# Patient Record
Sex: MALE | Race: WHITE
[De-identification: names, ages, dates, MRNs, and addresses within clinical notes are randomized per-mention and may not be internally consistent; named-entity substitution may affect disease eponyms.]

---

## 2018-02-14 ENCOUNTER — HOSPITAL ENCOUNTER (INPATIENT)
Dept: HOSPITAL 74 - YASAS | Age: 58
LOS: 6 days | Discharge: HOME | DRG: 897 | End: 2018-02-20
Attending: INTERNAL MEDICINE | Admitting: INTERNAL MEDICINE
Payer: COMMERCIAL

## 2018-02-14 VITALS — BODY MASS INDEX: 21.7 KG/M2

## 2018-02-14 DIAGNOSIS — Z91.5: ICD-10-CM

## 2018-02-14 DIAGNOSIS — F19.24: ICD-10-CM

## 2018-02-14 DIAGNOSIS — J45.909: ICD-10-CM

## 2018-02-14 DIAGNOSIS — F10.230: Primary | ICD-10-CM

## 2018-02-14 DIAGNOSIS — Z86.79: ICD-10-CM

## 2018-02-14 DIAGNOSIS — Z96.643: ICD-10-CM

## 2018-02-14 DIAGNOSIS — Z86.19: ICD-10-CM

## 2018-02-14 DIAGNOSIS — G47.00: ICD-10-CM

## 2018-02-14 DIAGNOSIS — Z95.1: ICD-10-CM

## 2018-02-14 LAB
APPEARANCE UR: CLEAR
BILIRUB UR STRIP.AUTO-MCNC: NEGATIVE MG/DL
COLOR UR: (no result)
KETONES UR QL STRIP: NEGATIVE
LEUKOCYTE ESTERASE UR QL STRIP.AUTO: NEGATIVE
NITRITE UR QL STRIP: NEGATIVE
PH UR: 5 [PH] (ref 5–8)
PROT UR QL STRIP: NEGATIVE
PROT UR QL STRIP: NEGATIVE
RBC # UR STRIP: NEGATIVE /UL
SP GR UR: 1.01 (ref 1–1.03)
UROBILINOGEN UR STRIP-MCNC: (no result) MG/DL (ref 0.2–1)

## 2018-02-14 PROCEDURE — HZ2ZZZZ DETOXIFICATION SERVICES FOR SUBSTANCE ABUSE TREATMENT: ICD-10-PCS | Performed by: INTERNAL MEDICINE

## 2018-02-14 RX ADMIN — HYDROXYZINE PAMOATE PRN MG: 50 CAPSULE ORAL at 22:05

## 2018-02-14 RX ADMIN — Medication SCH MG: at 22:03

## 2018-02-14 RX ADMIN — TOLNAFTATE SCH APPLIC: 10 CREAM TOPICAL at 22:03

## 2018-02-14 NOTE — CONSULT
[facility] Psychiatric Consult





- Data


Date of interview: 02/14/18


Admission source: [facility]


Identifying data: Readmission to [facility] for this 56 y/o  male 

seeking detox treatment on [address] for alcohol dependence.Patient is  

without children,domiciled,unemployed,disabled and supported on SSD benefits.


Substance Abuse History: Confirmed by patient in this interview.Smoking history

: Former smoker.  Have you smoked in the past 12 months: No.  If you are a 

former smoker, when did you quit?: 1996.  Hx Chewing Tobacco Use: No.  

Initiated information on smoking cessation: No.  - Substance & Tx. History.  Hx 

Alcohol Use: Yes.  Hx Substance Use: No.  Substance Use Type: Alcohol.  Hx 

Substance Use Treatment: Yes (last detox 2014 [facility]).  - Substances Abused.  

** Alcohol-vodka/beer.  Route: Oral.  Frequency: Daily.  Amount used: 1 pt./6-8 

(16 oz.).  Age of first use: 16.  Date of Last Use: 02/13/18


Medical History: Heart murmur,antecedent of treatment for gonorrhea,abdominal 

herniorraphy and recent history of orthosurgery (left hip prosthesis).


Psychiatric History: Patient reports a remote history of two psychiatric 

hospitalizations at [facility].Diagnosed with MDD.Mr Padron 

remembers past treatment with seroquel (other medications not recalled).Lost to 

OPD care for years as per self-report (no medications,no contact with mental 

healthcare providers).Patient admits to a suicide attempt," in the 1990's ",via 

self-mutilation (stabbed self).


Physical/Sexual Abuse/Trauma History: Patient denies history of abuse.


Additional Comment: Drug Screen is negative.





Mental Status Exam





- Mental Status Exam


Alert and Oriented to: Time, Place, Person


Cognitive Function: Good


Patient Appearance: Well Groomed


Mood: Nervous, Withdrawn, Anxious


Affect: Mood Congruent, Constricted


Patient Behavior: Passive, Fatigued, Cooperative


Speech Pattern: Clear, Appropriate


Voice Loudness: Normal


Thought Process: Intact, Goal Oriented


Thought Disorder: Not Present


Hallucinations: Denies


Suicidal Ideation: Denies


Homicidal Ideation: Denies


Insight/Judgement: Poor


Sleep: Poorly, Difficulty falling asleep


Appetite: Poor


Gait/Station: Other (gait not observed : patient remains supine during entire 

interview)





Psychiatric Findings





- Problem List (Axis 1, 2,3)


(1) Alcohol dependence with uncomplicated withdrawal


Current Visit: Yes   Status: Acute   





(2) Substance induced mood disorder


Current Visit: Yes   Status: Acute   





(3) Insomnia


Current Visit: Yes   Status: Acute   





- Initial Treatment Plan


Initial Treatment Plan: Psychoeducation and support.Sleep 

hygiene.Detoxification in progress.Ambien 5 mg po hs prn.Patient informed of 

risk of parasomnias (sleep-walking).Mr Padron agrees with this 

careplan.Observation.

## 2018-02-15 LAB
ALBUMIN SERPL-MCNC: 3.9 G/DL (ref 3.4–5)
ALP SERPL-CCNC: 140 U/L (ref 45–117)
ALT SERPL-CCNC: 41 U/L (ref 12–78)
ANION GAP SERPL CALC-SCNC: 8 MMOL/L (ref 8–16)
AST SERPL-CCNC: 64 U/L (ref 15–37)
BILIRUB SERPL-MCNC: 1.1 MG/DL (ref 0.2–1)
BUN SERPL-MCNC: 6 MG/DL (ref 7–18)
CALCIUM SERPL-MCNC: 8.7 MG/DL (ref 8.5–10.1)
CHLORIDE SERPL-SCNC: 101 MMOL/L (ref 98–107)
CO2 SERPL-SCNC: 28 MMOL/L (ref 21–32)
CREAT SERPL-MCNC: 0.7 MG/DL (ref 0.7–1.3)
DEPRECATED RDW RBC AUTO: 14.2 % (ref 11.9–15.9)
GLUCOSE SERPL-MCNC: 84 MG/DL (ref 74–106)
HCT VFR BLD CALC: 43.8 % (ref 35.4–49)
HGB BLD-MCNC: 14.3 GM/DL (ref 11.7–16.9)
MCH RBC QN AUTO: 31.3 PG (ref 25.7–33.7)
MCHC RBC AUTO-ENTMCNC: 32.5 G/DL (ref 32–35.9)
MCV RBC: 96.3 FL (ref 80–96)
PLATELET # BLD AUTO: 166 K/MM3 (ref 134–434)
PMV BLD: 7.6 FL (ref 7.5–11.1)
POTASSIUM SERPLBLD-SCNC: 4.3 MMOL/L (ref 3.5–5.1)
PROT SERPL-MCNC: 8.6 G/DL (ref 6.4–8.2)
RBC # BLD AUTO: 4.56 M/MM3 (ref 4–5.6)
SODIUM SERPL-SCNC: 137 MMOL/L (ref 136–145)
WBC # BLD AUTO: 5.7 K/MM3 (ref 4–10)

## 2018-02-15 RX ADMIN — ZOLPIDEM TARTRATE PRN MG: 5 TABLET, COATED ORAL at 22:09

## 2018-02-15 RX ADMIN — Medication SCH TAB: at 10:17

## 2018-02-15 RX ADMIN — TOLNAFTATE SCH: 10 CREAM TOPICAL at 22:10

## 2018-02-15 RX ADMIN — TOLNAFTATE SCH APPLIC: 10 CREAM TOPICAL at 10:17

## 2018-02-15 RX ADMIN — Medication SCH MG: at 22:09

## 2018-02-15 NOTE — PN
[facility] CIWA





- CIWA Score


Nausea/Vomitin-No Nausea/No Vomiting


Muscle Tremors: 3


Anxiety: 4-Mod. Anxious/Guarded


Agitation: 0-Normal Activity


Paroxysmal Sweats: No Perspiration


Orientation: 0-Oriented


Tacttile Disturbances: 3-Moderate Itch/Numb/Burn


Auditory Disturbances: 2-Mild Harshness/Frighten


Visual Disturbances: 3-Moderate Sensitivity


Headache: 0-None Present


CIWA-Ar Total Score: 15





BHS Progress Note (SOAP)


Subjective: 





Interrupted sleep, Body Aches, Diarrhea, Tremors, Anxious.


Objective: 


PT. A & O X 3, OBSERVED AMBULATING ON UNIT WITH ASSISTANCE OF A CANE. NO ACUTE 

DISTRESS.





02/15/18 12:04


 Vital Signs











Temperature  97.8 F   02/15/18 09:29


 


Pulse Rate  88   02/15/18 09:29


 


Respiratory Rate  18   02/15/18 09:29


 


Blood Pressure  151/92   02/15/18 09:29


 


O2 Sat by Pulse Oximetry (%)      








 Laboratory Tests











  02/14/18 02/15/18 02/15/18





  22:10 05:45 05:45


 


WBC   5.7 


 


RBC   4.56 


 


Hgb   14.3 


 


Hct   43.8 


 


MCV   96.3 H 


 


MCH   31.3 


 


MCHC   32.5 


 


RDW   14.2 


 


Plt Count   166  D 


 


MPV   7.6  D 


 


Sodium    137


 


Potassium    4.3


 


Chloride    101


 


Carbon Dioxide    28


 


Anion Gap    8


 


BUN    6 L D


 


Creatinine    0.7


 


Creat Clearance w eGFR    > 60


 


Random Glucose    84


 


Calcium    8.7


 


Total Bilirubin    1.1 H D


 


AST    64 H D


 


ALT    41  D


 


Alkaline Phosphatase    140 H D


 


Total Protein    8.6 H


 


Albumin    3.9


 


Urine Color  Dkyellow  


 


Urine Appearance  Clear  


 


Urine pH  5.0  


 


Ur Specific Gravity  1.014  


 


Urine Protein  Negative  


 


Urine Glucose (UA)  Negative  


 


Urine Ketones  Negative  


 


Urine Blood  Negative  


 


Urine Nitrite  Negative  


 


Urine Bilirubin  Negative  


 


Urine Urobilinogen  4.0 e.u/dl  


 


Ur Leukocyte Esterase  Negative  








LABS NOTED.


RPR RESULT PENDING.


02/15/18 12:05





Assessment: 





02/15/18 12:04


WITHDRAWAL SYMPTOMS.


Plan: 





CONTINUE DETOX.


INCREASE DAILY PO FLUID INTAKE.

## 2018-02-15 NOTE — EKG
Test Reason : 

Blood Pressure : ***/*** mmHG

Vent. Rate : 099 BPM     Atrial Rate : 099 BPM

   P-R Int : 122 ms          QRS Dur : 118 ms

    QT Int : 374 ms       P-R-T Axes : 015 -46 036 degrees

   QTc Int : 479 ms

 

NORMAL SINUS RHYTHM

LEFT AXIS DEVIATION

LEFT VENTRICULAR HYPERTROPHY WITH QRS WIDENING

ABNORMAL ECG

NO PREVIOUS ECGS AVAILABLE

Confirmed by MENDEZ KISER MD ([id number]) on 2/15/2018 11:52:48 AM

 

Referred By:             Confirmed By:MENDEZ KISER MD

## 2018-02-16 RX ADMIN — TOLNAFTATE SCH: 10 CREAM TOPICAL at 10:09

## 2018-02-16 RX ADMIN — Medication SCH TAB: at 10:09

## 2018-02-16 RX ADMIN — ZOLPIDEM TARTRATE PRN MG: 5 TABLET, COATED ORAL at 22:09

## 2018-02-16 RX ADMIN — Medication SCH MG: at 22:05

## 2018-02-16 RX ADMIN — TOLNAFTATE SCH APPLIC: 10 CREAM TOPICAL at 22:05

## 2018-02-16 NOTE — PN
[facility] CIWA





- CIWA Score


Nausea/Vomitin-No Nausea/No Vomiting


Muscle Tremors: 3


Anxiety: 4-Mod. Anxious/Guarded


Agitation: 2


Paroxysmal Sweats: No Perspiration


Orientation: 2-Disoriented Date<2 days


Tacttile Disturbances: 2-Mild Itch/Numbness/Burn


Auditory Disturbances: 0-None


Visual Disturbances: 2-Mild Sensitivity


Headache: 0-None Present


CIWA-Ar Total Score: 15





BHS Progress Note (SOAP)


Subjective: 





Anxious, Fatigue, Tremors, Body Aches.


Objective: 


PT. A & O X 2 (UNCERTAIN ABOUT CURRENT DAY / DATE). NO ACUTE DISTRESS.





18 13:07


 Vital Signs











Temperature  97.0 F L  18 09:42


 


Pulse Rate  87   18 09:42


 


Respiratory Rate  18   18 09:42


 


Blood Pressure  134/82   18 09:42


 


O2 Sat by Pulse Oximetry (%)      








 Laboratory Tests











  02/14/18 02/15/18 02/15/18





  22:10 05:45 05:45


 


WBC   5.7 


 


RBC   4.56 


 


Hgb   14.3 


 


Hct   43.8 


 


MCV   96.3 H 


 


MCH   31.3 


 


MCHC   32.5 


 


RDW   14.2 


 


Plt Count   166  D 


 


MPV   7.6  D 


 


Sodium    137


 


Potassium    4.3


 


Chloride    101


 


Carbon Dioxide    28


 


Anion Gap    8


 


BUN    6 L D


 


Creatinine    0.7


 


Creat Clearance w eGFR    > 60


 


Random Glucose    84


 


Calcium    8.7


 


Total Bilirubin    1.1 H D


 


AST    64 H D


 


ALT    41  D


 


Alkaline Phosphatase    140 H D


 


Total Protein    8.6 H


 


Albumin    3.9


 


Urine Color  Dkyellow  


 


Urine Appearance  Clear  


 


Urine pH  5.0  


 


Ur Specific Gravity  1.014  


 


Urine Protein  Negative  


 


Urine Glucose (UA)  Negative  


 


Urine Ketones  Negative  


 


Urine Blood  Negative  


 


Urine Nitrite  Negative  


 


Urine Bilirubin  Negative  


 


Urine Urobilinogen  4.0 e.u/dl  


 


Ur Leukocyte Esterase  Negative  


 


RPR Titer   














  02/15/18





  05:45


 


WBC 


 


RBC 


 


Hgb 


 


Hct 


 


MCV 


 


MCH 


 


MCHC 


 


RDW 


 


Plt Count 


 


MPV 


 


Sodium 


 


Potassium 


 


Chloride 


 


Carbon Dioxide 


 


Anion Gap 


 


BUN 


 


Creatinine 


 


Creat Clearance w eGFR 


 


Random Glucose 


 


Calcium 


 


Total Bilirubin 


 


AST 


 


ALT 


 


Alkaline Phosphatase 


 


Total Protein 


 


Albumin 


 


Urine Color 


 


Urine Appearance 


 


Urine pH 


 


Ur Specific Gravity 


 


Urine Protein 


 


Urine Glucose (UA) 


 


Urine Ketones 


 


Urine Blood 


 


Urine Nitrite 


 


Urine Bilirubin 


 


Urine Urobilinogen 


 


Ur Leukocyte Esterase 


 


RPR Titer  Nonreactive








LABS NOTED.


Assessment: 





18 13:10


WITHDRAWAL SYMPTOMS.


Plan: 





CONTINUE DETOX.


INCREASE DAILY PO FLUID INTAKE.

## 2018-02-17 RX ADMIN — TOLNAFTATE SCH: 10 CREAM TOPICAL at 10:25

## 2018-02-17 RX ADMIN — Medication SCH TAB: at 10:25

## 2018-02-17 RX ADMIN — Medication SCH MG: at 22:21

## 2018-02-17 RX ADMIN — HYDROXYZINE PAMOATE PRN MG: 50 CAPSULE ORAL at 22:23

## 2018-02-17 RX ADMIN — TOLNAFTATE SCH: 10 CREAM TOPICAL at 22:22

## 2018-02-17 NOTE — PN
[facility] Progress Note (SOAP)


Subjective: 





Body Aches, Interrupted sleep, Fatigue, Anxious.


Objective: 


PT. A & O X 2 (UNCERTAIN ABOUT CURRENT DAY/ DATE). PT. OBSERVED AMBULATING ON 

UNIT WITH ASSISTANCE OF A CANE. NO ACUTE DISTRESS.





02/17/18 16:45


 Vital Signs











Temperature  97.7 F   02/17/18 14:39


 


Pulse Rate  82   02/17/18 14:39


 


Respiratory Rate  18   02/17/18 14:39


 


Blood Pressure  133/78   02/17/18 14:39


 


O2 Sat by Pulse Oximetry (%)      








 Laboratory Tests











  02/14/18 02/15/18 02/15/18





  22:10 05:45 05:45


 


WBC   5.7 


 


RBC   4.56 


 


Hgb   14.3 


 


Hct   43.8 


 


MCV   96.3 H 


 


MCH   31.3 


 


MCHC   32.5 


 


RDW   14.2 


 


Plt Count   166  D 


 


MPV   7.6  D 


 


Sodium    137


 


Potassium    4.3


 


Chloride    101


 


Carbon Dioxide    28


 


Anion Gap    8


 


BUN    6 L D


 


Creatinine    0.7


 


Creat Clearance w eGFR    > 60


 


Random Glucose    84


 


Calcium    8.7


 


Total Bilirubin    1.1 H D


 


AST    64 H D


 


ALT    41  D


 


Alkaline Phosphatase    140 H D


 


Total Protein    8.6 H


 


Albumin    3.9


 


Urine Color  Dkyellow  


 


Urine Appearance  Clear  


 


Urine pH  5.0  


 


Ur Specific Gravity  1.014  


 


Urine Protein  Negative  


 


Urine Glucose (UA)  Negative  


 


Urine Ketones  Negative  


 


Urine Blood  Negative  


 


Urine Nitrite  Negative  


 


Urine Bilirubin  Negative  


 


Urine Urobilinogen  4.0 e.u/dl  


 


Ur Leukocyte Esterase  Negative  


 


RPR Titer   














  02/15/18





  05:45


 


WBC 


 


RBC 


 


Hgb 


 


Hct 


 


MCV 


 


MCH 


 


MCHC 


 


RDW 


 


Plt Count 


 


MPV 


 


Sodium 


 


Potassium 


 


Chloride 


 


Carbon Dioxide 


 


Anion Gap 


 


BUN 


 


Creatinine 


 


Creat Clearance w eGFR 


 


Random Glucose 


 


Calcium 


 


Total Bilirubin 


 


AST 


 


ALT 


 


Alkaline Phosphatase 


 


Total Protein 


 


Albumin 


 


Urine Color 


 


Urine Appearance 


 


Urine pH 


 


Ur Specific Gravity 


 


Urine Protein 


 


Urine Glucose (UA) 


 


Urine Ketones 


 


Urine Blood 


 


Urine Nitrite 


 


Urine Bilirubin 


 


Urine Urobilinogen 


 


Ur Leukocyte Esterase 


 


RPR Titer  Nonreactive








LABS NOTED.


Assessment: 





02/17/18 16:45


WITHDRAWAL SYMPTOMS.


Plan: 





CONTINUE DETOX.


DUE TO LINGERING DETOX SYMPTOMS, PATIENT'S CURRENT DIFFICULTIES WITH AMBULATION

, AND DUE TO THE FACT THAT THE PATIENT IS HOMELESS, PATIENT TO REMAIN ON DETOX 

UNIT 02/20/2018, AT WHICH TIME PATIENT WILL BE TAKEN TO [facility] 

FACILITY FOR AFTERCARE (ADMITTING MEDICAL PROVIDER UNAVAILABLE AT [facility] ON 

02/19/2018 TO DO ADMISSION ASSESSMENT, ADMISSION PLANNED FOR 02/20/2018 INSTEAD)

.

## 2018-02-18 RX ADMIN — Medication SCH MG: at 22:20

## 2018-02-18 RX ADMIN — HYDROXYZINE PAMOATE PRN MG: 50 CAPSULE ORAL at 22:21

## 2018-02-18 RX ADMIN — Medication SCH TAB: at 10:17

## 2018-02-18 RX ADMIN — TOLNAFTATE SCH: 10 CREAM TOPICAL at 10:17

## 2018-02-18 RX ADMIN — TOLNAFTATE SCH: 10 CREAM TOPICAL at 22:21

## 2018-02-18 NOTE — PN
[facility] Progress Note (SOAP)


Subjective: 





ALERT,IRRITABLE,ANXIOUS,INTERRUPTED SLEEP


Objective: 





02/18/18 18:21


 Vital Signs











Temperature  97.4 F L  02/18/18 18:09


 


Pulse Rate  94 H  02/18/18 18:09


 


Respiratory Rate  19   02/18/18 18:09


 


Blood Pressure  112/67   02/18/18 18:09


 


O2 Sat by Pulse Oximetry (%)      











Assessment: 





02/18/18 18:22


WITHDRAWAL SYMPTOM


Plan: 





CONTINUE DETOX

## 2018-02-19 RX ADMIN — TOLNAFTATE SCH: 10 CREAM TOPICAL at 10:43

## 2018-02-19 RX ADMIN — Medication SCH MG: at 22:03

## 2018-02-19 RX ADMIN — Medication SCH TAB: at 10:43

## 2018-02-19 RX ADMIN — HYDROXYZINE PAMOATE PRN MG: 50 CAPSULE ORAL at 22:05

## 2018-02-19 RX ADMIN — TOLNAFTATE SCH: 10 CREAM TOPICAL at 22:04

## 2018-02-19 NOTE — PN
[facility] Progress Note (SOAP)


Subjective: 





Interrupted Sleep, Body Aches, Anxious.


Objective: 


PT. A & O X 3, OBSERVED AMBULATING ON UNIT WITH ASSISTANCE OF A CANE. NO ACUTE 

DISTRESS.





02/19/18 16:17


 Vital Signs











Temperature  97.5 F L  02/19/18 14:07


 


Pulse Rate  79   02/19/18 14:07


 


Respiratory Rate  20   02/19/18 14:07


 


Blood Pressure  135/81   02/19/18 14:07


 


O2 Sat by Pulse Oximetry (%)      








 Laboratory Tests











  02/14/18 02/15/18 02/15/18





  22:10 05:45 05:45


 


WBC   5.7 


 


RBC   4.56 


 


Hgb   14.3 


 


Hct   43.8 


 


MCV   96.3 H 


 


MCH   31.3 


 


MCHC   32.5 


 


RDW   14.2 


 


Plt Count   166  D 


 


MPV   7.6  D 


 


Sodium    137


 


Potassium    4.3


 


Chloride    101


 


Carbon Dioxide    28


 


Anion Gap    8


 


BUN    6 L D


 


Creatinine    0.7


 


Creat Clearance w eGFR    > 60


 


Random Glucose    84


 


Calcium    8.7


 


Total Bilirubin    1.1 H D


 


AST    64 H D


 


ALT    41  D


 


Alkaline Phosphatase    140 H D


 


Total Protein    8.6 H


 


Albumin    3.9


 


Urine Color  Dkyellow  


 


Urine Appearance  Clear  


 


Urine pH  5.0  


 


Ur Specific Gravity  1.014  


 


Urine Protein  Negative  


 


Urine Glucose (UA)  Negative  


 


Urine Ketones  Negative  


 


Urine Blood  Negative  


 


Urine Nitrite  Negative  


 


Urine Bilirubin  Negative  


 


Urine Urobilinogen  4.0 e.u/dl  


 


Ur Leukocyte Esterase  Negative  


 


RPR Titer   














  02/15/18





  05:45


 


WBC 


 


RBC 


 


Hgb 


 


Hct 


 


MCV 


 


MCH 


 


MCHC 


 


RDW 


 


Plt Count 


 


MPV 


 


Sodium 


 


Potassium 


 


Chloride 


 


Carbon Dioxide 


 


Anion Gap 


 


BUN 


 


Creatinine 


 


Creat Clearance w eGFR 


 


Random Glucose 


 


Calcium 


 


Total Bilirubin 


 


AST 


 


ALT 


 


Alkaline Phosphatase 


 


Total Protein 


 


Albumin 


 


Urine Color 


 


Urine Appearance 


 


Urine pH 


 


Ur Specific Gravity 


 


Urine Protein 


 


Urine Glucose (UA) 


 


Urine Ketones 


 


Urine Blood 


 


Urine Nitrite 


 


Urine Bilirubin 


 


Urine Urobilinogen 


 


Ur Leukocyte Esterase 


 


RPR Titer  Nonreactive








LABS NOTED.


Assessment: 





02/19/18 16:17


WITHDRAWAL SYMPTOMS.


Plan: 





CONTINUE DETOX.

## 2018-02-20 VITALS — TEMPERATURE: 98.4 F | SYSTOLIC BLOOD PRESSURE: 132 MMHG | DIASTOLIC BLOOD PRESSURE: 78 MMHG | HEART RATE: 87 BPM

## 2018-02-20 RX ADMIN — Medication SCH TAB: at 10:20

## 2018-02-20 RX ADMIN — TOLNAFTATE SCH: 10 CREAM TOPICAL at 10:20

## 2018-02-20 NOTE — DS
[facility] Detox Discharge Summary


Admission Date: 


02/14/18





Discharge Date: 02/20/18





- History


Present History: Alcohol Dependence


Additional Comments: 





DETOX COMPLETED. ALERT O X 3. NAD.


Pertinent Past History: 





SEE DX BELOW





- Physical Exam Results


Vital Signs: 


 Vital Signs











Temperature  99.1 F   02/20/18 09:36


 


Pulse Rate  81   02/20/18 09:36


 


Respiratory Rate  20   02/20/18 09:36


 


Blood Pressure  131/74   02/20/18 09:36


 


O2 Sat by Pulse Oximetry (%)      











Pertinent Admission Physical Exam Findings: 





WITHDRAWAL SX


 Laboratory Last Values











WBC  5.7 K/mm3 (4.0-10.0)   02/15/18  05:45    


 


RBC  4.56 M/mm3 (4.00-5.60)   02/15/18  05:45    


 


Hgb  14.3 GM/dL (11.7-16.9)   02/15/18  05:45    


 


Hct  43.8 % (35.4-49)   02/15/18  05:45    


 


MCV  96.3 fl (80-96)  H  02/15/18  05:45    


 


MCH  31.3 pg (25.7-33.7)   02/15/18  05:45    


 


MCHC  32.5 g/dl (32.0-35.9)   02/15/18  05:45    


 


RDW  14.2 % (11.9-15.9)   02/15/18  05:45    


 


Plt Count  166 K/MM3 (134-434)  D 02/15/18  05:45    


 


MPV  7.6 fl (7.5-11.1)  D 02/15/18  05:45    


 


Sodium  137 mmol/L (136-145)   02/15/18  05:45    


 


Potassium  4.3 mmol/L (3.5-5.1)   02/15/18  05:45    


 


Chloride  101 mmol/L ()   02/15/18  05:45    


 


Carbon Dioxide  28 mmol/L (21-32)   02/15/18  05:45    


 


Anion Gap  8  (8-16)   02/15/18  05:45    


 


BUN  6 mg/dL (7-18)  L D 02/15/18  05:45    


 


Creatinine  0.7 mg/dL (0.7-1.3)   02/15/18  05:45    


 


Creat Clearance w eGFR  > 60  (>60)   02/15/18  05:45    


 


Random Glucose  84 mg/dL ()   02/15/18  05:45    


 


Calcium  8.7 mg/dL (8.5-10.1)   02/15/18  05:45    


 


Total Bilirubin  1.1 mg/dL (0.2-1.0)  H D 02/15/18  05:45    


 


AST  64 U/L (15-37)  H D 02/15/18  05:45    


 


ALT  41 U/L (12-78)  D 02/15/18  05:45    


 


Alkaline Phosphatase  140 U/L ()  H D 02/15/18  05:45    


 


Total Protein  8.6 g/dl (6.4-8.2)  H  02/15/18  05:45    


 


Albumin  3.9 g/dl (3.4-5.0)   02/15/18  05:45    


 


Urine Color  Dkyellow   02/14/18  22:10    


 


Urine Appearance  Clear   02/14/18  22:10    


 


Urine pH  5.0  (5.0-8.0)   02/14/18  22:10    


 


Ur Specific Gravity  1.014  (1.001-1.035)   02/14/18  22:10    


 


Urine Protein  Negative  (NEGATIVE)   02/14/18  22:10    


 


Urine Glucose (UA)  Negative  (NEGATIVE)   02/14/18  22:10    


 


Urine Ketones  Negative  (NEGATIVE)   02/14/18  22:10    


 


Urine Blood  Negative  (NEGATIVE)   02/14/18  22:10    


 


Urine Nitrite  Negative  (NEGATIVE)   02/14/18  22:10    


 


Urine Bilirubin  Negative  (NEGATIVE)   02/14/18  22:10    


 


Urine Urobilinogen  4.0 e.u/dl mg/dL (0.2-1.0)   02/14/18  22:10    


 


Ur Leukocyte Esterase  Negative  (NEGATIVE)   02/14/18  22:10    


 


RPR Titer  Nonreactive  (NONREACTIVE)   02/15/18  05:45    














- Treatment


Hospital Course: Detox Protocol Followed, Detoxed Safely, Responded well, 

Discharged Condition Good





- Medication


Discharge Medications: 


Ambulatory Orders





NK [No Known Home Medication]  02/14/18 











- Diagnosis


(1) Alcohol dependence with uncomplicated withdrawal


Current Visit: Yes   Status: Acute   





(2) H/O bilateral hip replacements


Current Visit: Yes   Status: Chronic   





(3) Asthma


Current Visit: No   Status: Chronic   





(4) H/O cardiac murmur


Current Visit: No   Status: Chronic   





- AMA


Did Patient Leave Against Medical Advice: No

## 2018-06-09 ENCOUNTER — HOSPITAL ENCOUNTER (INPATIENT)
Dept: HOSPITAL 74 - YASAS | Age: 58
LOS: 4 days | Discharge: HOME | DRG: 897 | End: 2018-06-13
Attending: SURGERY | Admitting: SURGERY
Payer: COMMERCIAL

## 2018-06-09 VITALS — BODY MASS INDEX: 23 KG/M2

## 2018-06-09 DIAGNOSIS — Z91.5: ICD-10-CM

## 2018-06-09 DIAGNOSIS — G47.00: ICD-10-CM

## 2018-06-09 DIAGNOSIS — F19.24: ICD-10-CM

## 2018-06-09 DIAGNOSIS — Z59.0: ICD-10-CM

## 2018-06-09 DIAGNOSIS — F10.230: Primary | ICD-10-CM

## 2018-06-09 DIAGNOSIS — Z88.8: ICD-10-CM

## 2018-06-09 DIAGNOSIS — Z98.890: ICD-10-CM

## 2018-06-09 DIAGNOSIS — E86.0: ICD-10-CM

## 2018-06-09 DIAGNOSIS — J45.909: ICD-10-CM

## 2018-06-09 DIAGNOSIS — Z87.438: ICD-10-CM

## 2018-06-09 DIAGNOSIS — Z96.643: ICD-10-CM

## 2018-06-09 DIAGNOSIS — R01.1: ICD-10-CM

## 2018-06-09 LAB
APPEARANCE UR: CLEAR
BILIRUB UR STRIP.AUTO-MCNC: NEGATIVE MG/DL
COLOR UR: (no result)
KETONES UR QL STRIP: NEGATIVE
LEUKOCYTE ESTERASE UR QL STRIP.AUTO: NEGATIVE
NITRITE UR QL STRIP: NEGATIVE
PH UR: 5 [PH] (ref 5–8)
PROT UR QL STRIP: NEGATIVE
PROT UR QL STRIP: NEGATIVE
SP GR UR: 1.01 (ref 1–1.03)
UROBILINOGEN UR STRIP-MCNC: NEGATIVE MG/DL (ref 0.2–1)

## 2018-06-09 PROCEDURE — HZ2ZZZZ DETOXIFICATION SERVICES FOR SUBSTANCE ABUSE TREATMENT: ICD-10-PCS | Performed by: SURGERY

## 2018-06-09 RX ADMIN — Medication SCH MG: at 22:47

## 2018-06-09 SDOH — ECONOMIC STABILITY - HOUSING INSECURITY: HOMELESSNESS: Z59.0

## 2018-06-09 NOTE — EKG
Test Reason : 

Blood Pressure : ***/*** mmHG

Vent. Rate : 083 BPM     Atrial Rate : 083 BPM

   P-R Int : 124 ms          QRS Dur : 108 ms

    QT Int : 408 ms       P-R-T Axes : 012 -49 042 degrees

   QTc Int : 479 ms

 

NORMAL SINUS RHYTHM

LEFT ANTERIOR FASCICULAR BLOCK

MINIMAL VOLTAGE CRITERIA FOR LVH, MAY BE NORMAL VARIANT

ABNORMAL ECG

WHEN COMPARED WITH ECG OF 14-FEB-2018 15:16,

NO SIGNIFICANT CHANGE WAS FOUND

Confirmed by MD Britton, Candido ([id number]) on 6/9/2018 2:47:21 PM

 

Referred By:             Confirmed By:Candido Jarquin MD

## 2018-06-09 NOTE — HP
CIWA Score





- CIWA Score


Nausea/Vomitin-Mild Nausea/No Vomiting


Muscle Tremors: 4-Moderate,w/Arms Extend


Anxiety: 4-Mod. Anxious/Guarded


Agitation: 1-Slight > Activity


Paroxysmal Sweats: 1-Minimal Palms Moist


Orientation: 1-Uncertain about Date


Tacttile Disturbances: 1-Very Mild Itch/Numbness


Auditory Disturbances: 0-None


Visual Disturbances: 0-None


Headache: 2-Mild


CIWA-Ar Total Score: 15





Admission ROS S





- HPI


Chief Complaint: 


I'm 57, I have to move on with my life, I have to stop drinking


Allergies/Adverse Reactions: 


 Allergies











Allergy/AdvReac Type Severity Reaction Status Date / Time


 


naproxen [From Naprosyn] Allergy Severe Rash Verified 18 09:45











History of Present Illness: 


58 yo gentleman here for detox from alcohol.  History of previous detox - last 

time .  No seizures but does have black outs.  


Exam Limitations: Clinical Condition





- Ebola screening


Have you traveled outside of the country in the last 21 days: No (N)


Have you had contact with anyone from an Ebola affected area: No


Have you been sick,other than usual withdrawal symptoms: No


Do you have a fever: No





- Review of Systems


Constitutional: Loss of Appetite, Malaise


EENT: reports: Blurred Vision


Respiratory: reports: No Symptoms reported


Cardiac: reports: No Symptoms Reported


GI: reports: Nausea, Poor Appetite, Poor Fluid Intake, Abdominal cramping


: reports: Frequency


Musculoskeletal: reports: Joint Pain, Joint Stiffness


Integumentary: reports: Dryness


Neuro: reports: Headache, Tremors


Endocrine: reports: No Symptoms Reported


Hematology: reports: No Symptoms Reported


Psychiatric: reports: Judgement Intact, Mood/Affect Appropiate, Anxious


Other Systems: Reviewed and Negative





Patient History





- Patient Medical History


Hx Anemia: No


Hx Asthma: No


Hx Chronic Obstructive Pulmonary Disease (COPD): No


Hx Cancer: No


Hx Cardiac Disorders: Yes (murmur)


Hx Congestive Heart Failure: No


Hx Hypertension: No


Hx Hypercholesterolemia: No


Hx Pacemaker: No


HX Cerebrovascular Accident: No


Hx Seizures: No


Hx Dementia: No


Hx Diabetes: No


Hx Gastrointestinal Disorders: No


Hx Liver Disease: No


Hx Genitourinary Disorders: No


Hx Sexually Transmitted Disorders: Yes (gonorrhea)


Hx Renal Disease (ESRD): No


Hx Thyroid Disease: No


Hx Human Immunodeficiency Virus (HIV): No (negative)


Hx Hepatitis C: No (negative)


Hx Depression: Yes (was on wellbutrin)


Hx Suicide Attempt: Yes (tried to slice wrist 2yrs ago, denies any current 

ideation)


Hx Bipolar Disorder: No


Hx Schizophrenia: No





- Patient Surgical History


Past Surgical History: Yes


Hx Neurologic Surgery: No


Hx Cataract Extraction: No


Hx Cardiac Surgery: Yes (open heart)


Hx Lung Surgery: No


Hx Breast Surgery: No


Hx Breast Biopsy: No


Hx Abdominal Surgery: Yes (hernia repair)


Hx Appendectomy: No


Hx Cholecystectomy: No


Hx Genitourinary Surgery: No


Hx  Section: No


Hx Orthopedic Surgery: Yes (Lt. hip prosthesis,metal plate rt. thigh)


Other Surgical History: sinus,vocal chord


Anesthesia Reaction: No





- PPD History


Previous Implant?: Yes


Documented Results: Negative w/proof


Implanted On Prior [facility] Admission?: Yes


Date: 18


Results: NEGATIVE


PPD to be Administered?: No





- Reproductive History


Patient is a Female of Child Bearing Age (11 -55 yrs old): No (male)





- Smoking Cessation


Smoking history: Never smoked


Have you smoked in the past 12 months: No


Hx Chewing Tobacco Use: No


Initiated information on smoking cessation: No





- Substance & Tx. History


Hx Alcohol Use: Yes


Hx Substance Use: No


Substance Use Type: Alcohol


Hx Substance Use Treatment: Yes (detox, rehab)





- Substances Abused


  ** Alcohol


Route: Oral


Frequency: Daily


Amount used: 2 PINTS OF VODKA


Age of first use: 16


Date of Last Use: 18





Family Disease History





- Family Disease History


Family Disease History: Diabetes: Mother (alive), Other: Father (unknown), 

Brother (two - unknown medical hx), Sister (two - unkown medical hx), Son (two 

sons - no contact), Daughter (two daughters - no contact)





Admission Physical Exam [facility]





- Vital Signs


Vital Signs: 


 Vital Signs - 24 hr











  18





  09:12


 


Temperature 97 F L


 


Pulse Rate 81


 


Respiratory 18





Rate 


 


Blood Pressure 122/67














- Physical


General Appearance: Yes: Nourished, Appropriately Dressed, Moderate Distress, 

Anxious


HEENTM: Yes: EOMI, Hearing grossly Normal, Normocephalic, Normal Voice, Pharynx 

Normal


Respiratory: Yes: Normal Breath Sounds, No Respiratory Distress


Neck: Yes: No masses,lesions,Nodules, Supple


Breast: Yes: Breast Exam Deferred


Cardiology: Yes: Regular Rhythm, Regular Rate, Other (no murmur appreciated)


Abdominal: Yes: Soft


Genitourinary: Yes: Frequency


Back: Yes: Decreased Range of Motion, Other (milkd kyphosis)


Musculoskeletal: Yes: Joint Stiffness, Other (needs cane to ambulate)


Extremities: Yes: Other (moves stiffly - wide gait due to hip replacements)


Neurological: Yes: Alert, Normal Mood/Affect, Normal Response


Integumentary: Yes: Normal Color, Dry, Warm


Lymphatic: Yes: Within Normal Limits





- Diagnostic


(1) Alcohol dependence with uncomplicated withdrawal


Current Visit: Yes   Status: Chronic   





(2) H/O bilateral hip replacements


Current Visit: Yes   Status: Chronic   Comment: pt has a waddle gait d/t B/L 

hip replacement   





(3) Dehydration


Current Visit: Yes   Status: Chronic   





Cleared for Admission [facility]





- Detox or Rehab


[facility] Level of Care: Medically Managed


Detox Regimen/Protocol: Librium





[facility] Breath Alcohol Content


Breath Alcohol Content: 0.090





Urine Drug Screen





- Results


Drug Screen Negative: Yes

## 2018-06-10 LAB
ALBUMIN SERPL-MCNC: 3.8 G/DL (ref 3.4–5)
ALP SERPL-CCNC: 111 U/L (ref 45–117)
ALT SERPL-CCNC: 38 U/L (ref 12–78)
ANION GAP SERPL CALC-SCNC: 5 MMOL/L (ref 8–16)
AST SERPL-CCNC: 45 U/L (ref 15–37)
BILIRUB SERPL-MCNC: 0.4 MG/DL (ref 0.2–1)
BUN SERPL-MCNC: 5 MG/DL (ref 7–18)
CALCIUM SERPL-MCNC: 8.7 MG/DL (ref 8.5–10.1)
CHLORIDE SERPL-SCNC: 105 MMOL/L (ref 98–107)
CO2 SERPL-SCNC: 28 MMOL/L (ref 21–32)
CREAT SERPL-MCNC: 0.7 MG/DL (ref 0.7–1.3)
DEPRECATED RDW RBC AUTO: 14.7 % (ref 11.9–15.9)
GLUCOSE SERPL-MCNC: 86 MG/DL (ref 74–106)
HCT VFR BLD CALC: 43.3 % (ref 35.4–49)
HGB BLD-MCNC: 14.6 GM/DL (ref 11.7–16.9)
MCH RBC QN AUTO: 31.4 PG (ref 25.7–33.7)
MCHC RBC AUTO-ENTMCNC: 33.7 G/DL (ref 32–35.9)
MCV RBC: 93.2 FL (ref 80–96)
PLATELET # BLD AUTO: 288 K/MM3 (ref 134–434)
PMV BLD: 7.1 FL (ref 7.5–11.1)
POTASSIUM SERPLBLD-SCNC: 4.4 MMOL/L (ref 3.5–5.1)
PROT SERPL-MCNC: 8.4 G/DL (ref 6.4–8.2)
RBC # BLD AUTO: 4.64 M/MM3 (ref 4–5.6)
SODIUM SERPL-SCNC: 138 MMOL/L (ref 136–145)
WBC # BLD AUTO: 3.2 K/MM3 (ref 4–10)

## 2018-06-10 RX ADMIN — Medication SCH TAB: at 10:18

## 2018-06-10 RX ADMIN — Medication SCH: at 22:38

## 2018-06-10 RX ADMIN — Medication PRN MG: at 22:37

## 2018-06-10 RX ADMIN — LIDOCAINE SCH PATCH: 50 PATCH TOPICAL at 14:08

## 2018-06-10 RX ADMIN — Medication SCH MG: at 22:36

## 2018-06-10 NOTE — CONSULT
[facility] Psychiatric Consult





- Data


Date of interview: 06/10/18


Admission source: Admitted as a transfer from [facility] 


Identifying data: 58 y/o male single, unemployed, SSI recipient father of 5 

grown children


Substance Abuse History: He presented with a history of chronic alcohol abuse 

and mul;tiple ppast Detox treatment.  he has little recollection of the 

circumstances leading him to [facility] but admitted that he was drunk.  

Refer to addiction counselor summary for more detailed drug history


Medical History: History of heart murmur.  Past history of open heart surgery 

in 2001.  Asthma.  Hip prostheisis metal plate right hip


Psychiatric History: Historry of depression.  Past psychiatric treatment with 

Wellbutrin.  He has been off meds for a long time.  C/o depressed mood, 

insomnia.  No suicidal or homicidal ideation


Physical/Sexual Abuse/Trauma History: Patient is unable to provide information 

due to drowsiness





Mental Status Exam





- Mental Status Exam


Cognitive Function: Impaired


Patient Appearance: Unkempt


Mood: Apathetic, Depressed


Affect: Appropriate


Patient Behavior: Sedated, Fatigued, Cooperative


Speech Pattern: Slurred, Garbled


Voice Loudness: Severely Soft/Quiet


Thought Process: Intact


Thought Disorder: Not Present


Hallucinations: None


Suicidal Ideation: None


Homicidal Ideation: None


Insight/Judgement: Poor


Sleep: Poorly


Appetite: Fair


Muscle strength/Tone: Normal


Gait/Station: Normal





Psychiatric Findings





- Problem List (Axis 1, 2,3)


(1) Alcohol dependence with uncomplicated withdrawal


Current Visit: Yes   Status: Acute   





(2) Asthma


Current Visit: Yes   Status: Chronic   


Qualifiers: 


   Asthma severity: mild   Asthma persistence: unspecified   Asthma 

complication type: uncomplicated   Qualified Code(s): J45.909 - Unspecified 

asthma, uncomplicated   





(3) H/O bilateral hip replacements


Current Visit: Yes   Status: Chronic   Comment: pt has a waddle gait d/t B/L 

hip replacement   





(4) Insomnia


Current Visit: No   Status: Acute   





(5) Substance induced mood disorder


Current Visit: No   Status: Acute   





(6) Alcohol dependence


Current Visit: No   Status: Chronic

## 2018-06-10 NOTE — PN
S CIWA





- CIWA Score


Nausea/Vomitin-No Nausea/No Vomiting


Muscle Tremors: 4-Moderate,w/Arms Extend


Anxiety: 4-Mod. Anxious/Guarded


Agitation: 3


Paroxysmal Sweats: 1-Minimal Palms Moist


Orientation: 0-Oriented


Tacttile Disturbances: 3-Moderate Itch/Numb/Burn


Auditory Disturbances: 0-None


Visual Disturbances: 0-None


Headache: 0-None Present


CIWA-Ar Total Score: 15





BHS Progress Note (SOAP)


Subjective: 





ANXIETY,SWEATS,BACK PAIN.


Objective: 





06/10/18 12:34


 Vital Signs











  06/10/18 06/10/18





  06:11 09:47


 


Temperature 98 F 97.1 F L


 


Pulse Rate 77 70


 


Respiratory 18 18





Rate  


 


Blood Pressure 131/80 128/76








 Laboratory Tests











  06/09/18 06/10/18 06/10/18





  Unknown 05:55 05:55


 


WBC   3.2 L D 


 


RBC   4.64 


 


Hgb   14.6 


 


Hct   43.3 


 


MCV   93.2 


 


MCH   31.4 


 


MCHC   33.7 


 


RDW   14.7 


 


Plt Count   288  D 


 


MPV   7.1 L 


 


Sodium    138


 


Potassium    4.4


 


Chloride    105


 


Carbon Dioxide    28


 


Anion Gap    5 L


 


BUN    5 L


 


Creatinine    0.7


 


Creat Clearance w eGFR    > 60


 


Random Glucose    86


 


Calcium    8.7


 


Total Bilirubin    0.4  D


 


AST    45 H D


 


ALT    38


 


Alkaline Phosphatase    111  D


 


Total Protein    8.4 H


 


Albumin    3.8


 


Urine Color  Ltyellow  


 


Urine Appearance  Clear  


 


Urine pH  5.0  


 


Ur Specific Gravity  1.006  


 


Urine Protein  Negative  


 


Urine Glucose (UA)  Negative  


 


Urine Ketones  Negative  


 


Urine Blood  Negative  


 


Urine Nitrite  Negative  


 


Urine Bilirubin  Negative  


 


Urine Urobilinogen  Negative  


 


Ur Leukocyte Esterase  Negative  


 


RPR Titer   














  06/10/18





  05:55


 


WBC 


 


RBC 


 


Hgb 


 


Hct 


 


MCV 


 


MCH 


 


MCHC 


 


RDW 


 


Plt Count 


 


MPV 


 


Sodium 


 


Potassium 


 


Chloride 


 


Carbon Dioxide 


 


Anion Gap 


 


BUN 


 


Creatinine 


 


Creat Clearance w eGFR 


 


Random Glucose 


 


Calcium 


 


Total Bilirubin 


 


AST 


 


ALT 


 


Alkaline Phosphatase 


 


Total Protein 


 


Albumin 


 


Urine Color 


 


Urine Appearance 


 


Urine pH 


 


Ur Specific Gravity 


 


Urine Protein 


 


Urine Glucose (UA) 


 


Urine Ketones 


 


Urine Blood 


 


Urine Nitrite 


 


Urine Bilirubin 


 


Urine Urobilinogen 


 


Ur Leukocyte Esterase 


 


RPR Titer  Nonreactive











Assessment: 





06/10/18 12:35


WITHDRAWAL SX


Plan: 





CONTINUE DETOX


LIDOCAINE PATCH AS DIRECTED.

## 2018-06-11 RX ADMIN — Medication SCH EACH: at 22:27

## 2018-06-11 RX ADMIN — Medication PRN MG: at 22:26

## 2018-06-11 RX ADMIN — LIDOCAINE SCH PATCH: 50 PATCH TOPICAL at 10:31

## 2018-06-11 RX ADMIN — Medication SCH TAB: at 10:31

## 2018-06-11 RX ADMIN — Medication SCH MG: at 22:25

## 2018-06-11 NOTE — PN
S CIWA





- CIWA Score


Nausea/Vomitin-No Nausea/No Vomiting


Muscle Tremors: 4-Moderate,w/Arms Extend


Anxiety: 5


Agitation: 4-Moderately Restless


Paroxysmal Sweats: 1-Minimal Palms Moist


Orientation: 0-Oriented


Tacttile Disturbances: 0-None


Auditory Disturbances: 0-None


Visual Disturbances: 0-None


Headache: 0-None Present


CIWA-Ar Total Score: 14





BHS Progress Note (SOAP)


Subjective: 





ANXIETY,IRRITABILITY,SWEATS,BODY ACHES/BACK PAIN.


Objective: 





18 11:33


 Vital Signs











  18





  06:23 06:30 09:09


 


Temperature 97.7 F  97.3 F L


 


Pulse Rate 66  68


 


Respiratory 18 18 18





Rate   


 


Blood Pressure 119/80  131/84








 Laboratory Tests











  06/09/18 06/10/18 06/10/18





  Unknown 05:55 05:55


 


WBC   3.2 L D 


 


RBC   4.64 


 


Hgb   14.6 


 


Hct   43.3 


 


MCV   93.2 


 


MCH   31.4 


 


MCHC   33.7 


 


RDW   14.7 


 


Plt Count   288  D 


 


MPV   7.1 L 


 


Sodium    138


 


Potassium    4.4


 


Chloride    105


 


Carbon Dioxide    28


 


Anion Gap    5 L


 


BUN    5 L


 


Creatinine    0.7


 


Creat Clearance w eGFR    > 60


 


Random Glucose    86


 


Calcium    8.7


 


Total Bilirubin    0.4  D


 


AST    45 H D


 


ALT    38


 


Alkaline Phosphatase    111  D


 


Total Protein    8.4 H


 


Albumin    3.8


 


Urine Color  Ltyellow  


 


Urine Appearance  Clear  


 


Urine pH  5.0  


 


Ur Specific Gravity  1.006  


 


Urine Protein  Negative  


 


Urine Glucose (UA)  Negative  


 


Urine Ketones  Negative  


 


Urine Blood  Negative  


 


Urine Nitrite  Negative  


 


Urine Bilirubin  Negative  


 


Urine Urobilinogen  Negative  


 


Ur Leukocyte Esterase  Negative  


 


RPR Titer   














  06/10/18





  05:55


 


WBC 


 


RBC 


 


Hgb 


 


Hct 


 


MCV 


 


MCH 


 


MCHC 


 


RDW 


 


Plt Count 


 


MPV 


 


Sodium 


 


Potassium 


 


Chloride 


 


Carbon Dioxide 


 


Anion Gap 


 


BUN 


 


Creatinine 


 


Creat Clearance w eGFR 


 


Random Glucose 


 


Calcium 


 


Total Bilirubin 


 


AST 


 


ALT 


 


Alkaline Phosphatase 


 


Total Protein 


 


Albumin 


 


Urine Color 


 


Urine Appearance 


 


Urine pH 


 


Ur Specific Gravity 


 


Urine Protein 


 


Urine Glucose (UA) 


 


Urine Ketones 


 


Urine Blood 


 


Urine Nitrite 


 


Urine Bilirubin 


 


Urine Urobilinogen 


 


Ur Leukocyte Esterase 


 


RPR Titer  Nonreactive











Assessment: 





18 11:33


WITHDRAWAL SX


Plan: 





CONTINUE DETOX


MOTRIN PRN

## 2018-06-12 RX ADMIN — Medication SCH EACH: at 22:27

## 2018-06-12 RX ADMIN — Medication PRN MG: at 22:25

## 2018-06-12 RX ADMIN — Medication SCH TAB: at 10:17

## 2018-06-12 RX ADMIN — Medication SCH MG: at 22:25

## 2018-06-12 RX ADMIN — LIDOCAINE SCH PATCH: 50 PATCH TOPICAL at 10:17

## 2018-06-12 NOTE — PN
BHS Progress Note (SOAP)


Subjective: 





ANXIETY,IRRITABILITY,BODY ACHES.


Objective: 





06/12/18 11:43


 Vital Signs











  06/12/18 06/12/18





  06:15 09:13


 


Temperature 97.2 F L 97.1 F L


 


Pulse Rate 63 76


 


Respiratory 18 18





Rate  


 


Blood Pressure 112/71 103/68








 Laboratory Tests











  06/09/18 06/10/18 06/10/18





  Unknown 05:55 05:55


 


WBC   3.2 L D 


 


RBC   4.64 


 


Hgb   14.6 


 


Hct   43.3 


 


MCV   93.2 


 


MCH   31.4 


 


MCHC   33.7 


 


RDW   14.7 


 


Plt Count   288  D 


 


MPV   7.1 L 


 


Sodium    138


 


Potassium    4.4


 


Chloride    105


 


Carbon Dioxide    28


 


Anion Gap    5 L


 


BUN    5 L


 


Creatinine    0.7


 


Creat Clearance w eGFR    > 60


 


Random Glucose    86


 


Calcium    8.7


 


Total Bilirubin    0.4  D


 


AST    45 H D


 


ALT    38


 


Alkaline Phosphatase    111  D


 


Total Protein    8.4 H


 


Albumin    3.8


 


Urine Color  Ltyellow  


 


Urine Appearance  Clear  


 


Urine pH  5.0  


 


Ur Specific Gravity  1.006  


 


Urine Protein  Negative  


 


Urine Glucose (UA)  Negative  


 


Urine Ketones  Negative  


 


Urine Blood  Negative  


 


Urine Nitrite  Negative  


 


Urine Bilirubin  Negative  


 


Urine Urobilinogen  Negative  


 


Ur Leukocyte Esterase  Negative  


 


RPR Titer   














  06/10/18





  05:55


 


WBC 


 


RBC 


 


Hgb 


 


Hct 


 


MCV 


 


MCH 


 


MCHC 


 


RDW 


 


Plt Count 


 


MPV 


 


Sodium 


 


Potassium 


 


Chloride 


 


Carbon Dioxide 


 


Anion Gap 


 


BUN 


 


Creatinine 


 


Creat Clearance w eGFR 


 


Random Glucose 


 


Calcium 


 


Total Bilirubin 


 


AST 


 


ALT 


 


Alkaline Phosphatase 


 


Total Protein 


 


Albumin 


 


Urine Color 


 


Urine Appearance 


 


Urine pH 


 


Ur Specific Gravity 


 


Urine Protein 


 


Urine Glucose (UA) 


 


Urine Ketones 


 


Urine Blood 


 


Urine Nitrite 


 


Urine Bilirubin 


 


Urine Urobilinogen 


 


Ur Leukocyte Esterase 


 


RPR Titer  Nonreactive











Assessment: 





06/12/18 11:43


WITHDRAWAL SX


Plan: 





CONTINUE DETOX

## 2018-06-13 VITALS — SYSTOLIC BLOOD PRESSURE: 129 MMHG | DIASTOLIC BLOOD PRESSURE: 77 MMHG | TEMPERATURE: 97.9 F | HEART RATE: 77 BPM

## 2018-06-13 RX ADMIN — Medication SCH TAB: at 10:28

## 2018-06-13 RX ADMIN — LIDOCAINE SCH PATCH: 50 PATCH TOPICAL at 10:28

## 2018-06-13 NOTE — DS
BHS Detox Discharge Summary


Admission Date: 


06/09/18





Discharge Date: 06/13/18





- History


Present History: Alcohol Dependence


Additional Comments: 





DETOX COMPLETED. PT INSTRUCTED TO F/U AT [facility] FOR MEDICAL 

MANAGEMENT AS NEEDED.


Pertinent Past History: 





PLEASE SEE DX BELOW





- Physical Exam Results


Vital Signs: 


 Vital Signs











Temperature  97.5 F L  06/13/18 09:22


 


Pulse Rate  63   06/13/18 09:22


 


Respiratory Rate  18   06/13/18 09:22


 


Blood Pressure  105/68   06/13/18 09:22


 


O2 Sat by Pulse Oximetry (%)      











Pertinent Admission Physical Exam Findings: 





WITHDRAWAL SX





- Treatment


Hospital Course: Detox Protocol Followed, Detoxed Safely, Responded well, 

Discharged Condition Good, Rehab Referral Accepted


Patient has Accepted a Rehab Referral to: [facility]





- Medication


Discharge Medications: 


Ambulatory Orders





NK [No Known Home Medication]  02/14/18 











- Diagnosis


(1) Alcohol dependence with uncomplicated withdrawal


Current Visit: Yes   Status: Acute   





(2) Dehydration


Current Visit: Yes   Status: Acute   





(3) H/O bilateral hip replacements


Current Visit: Yes   Status: Chronic   





(4) Asthma


Current Visit: Yes   Status: Chronic   


Qualifiers: 


   Asthma severity: mild   Asthma persistence: unspecified   Asthma 

complication type: uncomplicated   Qualified Code(s): J45.909 - Unspecified 

asthma, uncomplicated   





- AMA


Did Patient Leave Against Medical Advice: No

## 2018-06-13 NOTE — PN
BHS Progress Note (SOAP)


Subjective: 





DETOX COMPLETED. PT REFUSED TO STAY FOR REHAB AT [facility]. PT WENT 

HOME.INSTRUCTED TO FOLLOW UP AT [facility] FOR MEDICAL MANAGEMENT 

AS NEEDED.


 











 














Objective: 





06/13/18 11:00


 Vital Signs











  06/13/18 06/13/18 06/13/18





  03:30 06:30 06:41


 


Temperature   97.4 F L


 


Pulse Rate   62


 


Respiratory 18 18 16





Rate   


 


Blood Pressure   102/59














  06/13/18





  09:22


 


Temperature 97.5 F L


 


Pulse Rate 63


 


Respiratory 18





Rate 


 


Blood Pressure 105/68








 Laboratory Tests











  06/09/18 06/10/18 06/10/18





  Unknown 05:55 05:55


 


WBC   3.2 L D 


 


RBC   4.64 


 


Hgb   14.6 


 


Hct   43.3 


 


MCV   93.2 


 


MCH   31.4 


 


MCHC   33.7 


 


RDW   14.7 


 


Plt Count   288  D 


 


MPV   7.1 L 


 


Sodium    138


 


Potassium    4.4


 


Chloride    105


 


Carbon Dioxide    28


 


Anion Gap    5 L


 


BUN    5 L


 


Creatinine    0.7


 


Creat Clearance w eGFR    > 60


 


Random Glucose    86


 


Calcium    8.7


 


Total Bilirubin    0.4  D


 


AST    45 H D


 


ALT    38


 


Alkaline Phosphatase    111  D


 


Total Protein    8.4 H


 


Albumin    3.8


 


Urine Color  Ltyellow  


 


Urine Appearance  Clear  


 


Urine pH  5.0  


 


Ur Specific Gravity  1.006  


 


Urine Protein  Negative  


 


Urine Glucose (UA)  Negative  


 


Urine Ketones  Negative  


 


Urine Blood  Negative  


 


Urine Nitrite  Negative  


 


Urine Bilirubin  Negative  


 


Urine Urobilinogen  Negative  


 


Ur Leukocyte Esterase  Negative  


 


RPR Titer   














  06/10/18





  05:55


 


WBC 


 


RBC 


 


Hgb 


 


Hct 


 


MCV 


 


MCH 


 


MCHC 


 


RDW 


 


Plt Count 


 


MPV 


 


Sodium 


 


Potassium 


 


Chloride 


 


Carbon Dioxide 


 


Anion Gap 


 


BUN 


 


Creatinine 


 


Creat Clearance w eGFR 


 


Random Glucose 


 


Calcium 


 


Total Bilirubin 


 


AST 


 


ALT 


 


Alkaline Phosphatase 


 


Total Protein 


 


Albumin 


 


Urine Color 


 


Urine Appearance 


 


Urine pH 


 


Ur Specific Gravity 


 


Urine Protein 


 


Urine Glucose (UA) 


 


Urine Ketones 


 


Urine Blood 


 


Urine Nitrite 


 


Urine Bilirubin 


 


Urine Urobilinogen 


 


Ur Leukocyte Esterase 


 


RPR Titer  Nonreactive











Assessment: 





06/13/18 11:00


MEDICALLY STABLE


Plan: 





D/C PT TODAY

## 2020-02-20 ENCOUNTER — HOSPITAL ENCOUNTER (INPATIENT)
Dept: HOSPITAL 74 - YASAS | Age: 60
LOS: 5 days | Discharge: OTHER INSTITUTION | DRG: 897 | End: 2020-02-25
Attending: ALLERGY & IMMUNOLOGY | Admitting: ALLERGY & IMMUNOLOGY
Payer: COMMERCIAL

## 2020-02-20 VITALS — BODY MASS INDEX: 27.6 KG/M2

## 2020-02-20 DIAGNOSIS — Z88.8: ICD-10-CM

## 2020-02-20 DIAGNOSIS — Z91.5: ICD-10-CM

## 2020-02-20 DIAGNOSIS — F32.9: ICD-10-CM

## 2020-02-20 DIAGNOSIS — Z96.643: ICD-10-CM

## 2020-02-20 DIAGNOSIS — J45.20: ICD-10-CM

## 2020-02-20 DIAGNOSIS — F10.230: Primary | ICD-10-CM

## 2020-02-20 DIAGNOSIS — F17.210: ICD-10-CM

## 2020-02-20 DIAGNOSIS — Z86.19: ICD-10-CM

## 2020-02-20 DIAGNOSIS — Z59.0: ICD-10-CM

## 2020-02-20 DIAGNOSIS — Z87.01: ICD-10-CM

## 2020-02-20 DIAGNOSIS — F19.24: ICD-10-CM

## 2020-02-20 PROCEDURE — HZ2ZZZZ DETOXIFICATION SERVICES FOR SUBSTANCE ABUSE TREATMENT: ICD-10-PCS | Performed by: ALLERGY & IMMUNOLOGY

## 2020-02-20 RX ADMIN — Medication SCH MG: at 22:15

## 2020-02-20 RX ADMIN — Medication PRN MG: at 22:15

## 2020-02-20 SDOH — ECONOMIC STABILITY - HOUSING INSECURITY: HOMELESSNESS: Z59.0

## 2020-02-20 NOTE — BHS.RME
Substance Use & Tx History





- Substance Use History


  ** Alcohol


Substance amount: 2 pints vodka


Frequency of use: Daily


Substance route: Oral


Date of Last Use: 20





- Last Treatment


Date of last treatment: 2020


Treatment type: Medical


Where was last treatment: patient at [facility] yesterday due to 

blackout and given one dose of librium





Physical/Psych/Mental Status





- Behavior


Eye Contact: Normal





- Cooperativeness


Cooperativeness: Cooperative





- Thinking


Thought Processes: Tight, Logical, Goal Directed





- Physical Health Problems


Is patient presently having any pain?: No


Does patient presently have any injuries (include location): No


Does patient currently have a fever: No


Is patient pregnant: No





CIWA


Nausea/Vomitin


Muscle Tremors: 4-Moderate,w/Arms Extend


Anxiety: 3


Agitation: 1-Slight > Activity


Paroxysmal Sweats: 4-Forehead w/Sweat Beads


Orientation: 1-Uncertain about Date


Tacttile Disturbances: 1-Very Mild Itch/Numbness


Auditory Disturbances: 0-None


Visual Disturbances: 0-None


Headache: 1-Very Mild


CIWA-Ar Total Score: 17

## 2020-02-20 NOTE — HP
CIWA Score


Nausea/Vomitin-No Nausea/No Vomiting


Muscle Tremors: 4-Moderate,w/Arms Extend


Anxiety: 3


Agitation: 1-Slight > Activity


Paroxysmal Sweats: 2


Orientation: 1-Uncertain about Date


Tacttile Disturbances: 1-Very Mild Itch/Numbness


Auditory Disturbances: 0-None


Visual Disturbances: 0-None


Headache: 1-Very Mild


CIWA-Ar Total Score: 13





- Admission Criteria


[facility] Guidelines: Admission for Medically Managed Detox: 


Requires at least one of the followin. CIWA greater than 12


2. Seizures within the past 24 hours


3. Delirium tremens within the past 24 hours


4. Hallucinations within the past 24 hours


5. Acute intervention needed for co  occurring medical disorder


6. Acute intervention needed for co  occurring psychiatric disorder


7. Severe withdrawal that cannot be handled at a lower level of care (continued


    vomiting, continued diarrhea, abnormal vital signs) requiring intravenous


    medication and/or fluids


8. Pregnancy








Admitting History and Physical





- Admission


History of Present Illness: 


This is a 59 year old male with PMH of open heart surgery, alcohol abuse, major 

depression, takes no medications. He presented to the clinic for detox from 

alcohol. He was previously admitted here between  - 2018 for detox. 





Started drinking around 40 years ago. Drinks 2 litres of vodka and 1-16oz can 

of beer daily. Last drink was yesterday, had 2 pints of vodka. No hx of seizures

, last blackout was yesterday.


Day before yesterday he was found on the street intoxicated and was taken to 

[facility], where he spent the night and was D/C the following day. 

Spent last evening and night at [facility] after he experienced chest pain. As 

per the patient, he was diagnosed with Pneumonia/Bronchitis and was sent to 

[facility] for detox.





Quit smoking in , a couple of cigarettes per day. Does not use any other 

substances.





Diagnosed with major depression, not on any meds. [place] thought about jumping [place] 

front [place] the train tracks over the past few days, but states that he was not 

serious about it. Overdosed on medication pills as a teenager, does not 

remember the pills. 





ROS:


- Low mood





PSH:


- 7 surgeries


- Last surgery for hernia in 


- Open heart surgery 


- Sinus


- throat


- broken hip


- hip replacement B/L





Social:


- [place]


- 3 daughters and 1 son, only in touch with daughter





Physical Exam:


- AOx3


- Lungs: B/L clear


- CVS: RRR


- GI: Soft, distended, umbilical hernia present, NT


- Extremities: No edema, no calf tenderness, 2+ pulses B/L


- CNS: Motor 5/5, sensations intact





Plan:


- CIWA 13, will admit for detox


- CXR, pt states he was diagnosed with pneumonia


- PPD, has been homeless since last visit 2 years ago


- Psych consult for hx of depression 





- Smoking History


Smoking history: Never smoked


Have you smoked in the past 12 months: No


Aproximately how many cigarettes per day: 5


If you are a former smoker, when did you quit?: 





- Alcohol/Substance Use


Hx Alcohol Use: Yes





Admission [facility]


Allergies/Adverse Reactions: 


 Allergies











Allergy/AdvReac Type Severity Reaction Status Date / Time


 


naproxen [From Naprosyn] Allergy Severe Rash Verified 20 14:28














Patient History





- Patient Medical History


Hx Anemia: No


Hx Asthma: No


Hx Chronic Obstructive Pulmonary Disease (COPD): No


Hx Cancer: No


Hx Cardiac Disorders: Yes (murmur)


Hx Congestive Heart Failure: No


Hx Hypertension: No


Hx Hypercholesterolemia: No


Hx Pacemaker: No


HX Cerebrovascular Accident: No


Hx Seizures: No


Hx Dementia: No


Hx Diabetes: No


Hx Gastrointestinal Disorders: No


Hx Liver Disease: No


Hx Genitourinary Disorders: No


Hx Sexually Transmitted Disorders: Yes (gonorrhea)


Hx Renal Disease (ESRD): No


Hx Thyroid Disease: No


Hx Human Immunodeficiency Virus (HIV): No (negative)


Hx Hepatitis C: No (negative)


Hx Depression: Yes (was on wellbutrin)


Hx Suicide Attempt: Yes (tried to slice wrist 2yrs ago, denies any current 

ideation)


Hx Bipolar Disorder: No


Hx Schizophrenia: No





- Patient Surgical History


Past Surgical History: Yes


Hx Neurologic Surgery: No


Hx Cataract Extraction: No


Hx Cardiac Surgery: Yes (open heart)


Hx Lung Surgery: No


Hx Breast Surgery: No


Hx Breast Biopsy: No


Hx Abdominal Surgery: Yes (hernia repair)


Hx Appendectomy: No


Hx Cholecystectomy: No


Hx Genitourinary Surgery: No


Hx  Section: No


Hx Orthopedic Surgery: Yes (Lt. hip prosthesis,metal plate rt. thigh)


Other Surgical History: sinus,vocal chord


Anesthesia Reaction: No





- PPD History


Date: 18


Results: NEGATIVE





- Smoking Cessation


Smoking history: Never smoked


Have you smoked in the past 12 months: No


Aproximately how many cigarettes per day: 5


If you are a former smoker, when did you quit?: 1996 Chewing Tobacco Use: No





Breathalyzer





- Breathalyzer


Breathalyzer: 0





Urine Drug Screen





- Test Device


Lot number: [id number]


Expiration date: 21





- Control


Is test valid?: Yes





- Results


Drug screen NEGATIVE: No


Urine drug screen results: BZO-Benzodiazepines





Inpatient Rehab Admission





- Rehab Decision to Admit


Inpatient rehab admission?: No

## 2020-02-21 LAB
ALBUMIN SERPL-MCNC: 3.8 G/DL (ref 3.4–5)
ALP SERPL-CCNC: 118 U/L (ref 45–117)
ALT SERPL-CCNC: 83 U/L (ref 13–61)
ANION GAP SERPL CALC-SCNC: 5 MMOL/L (ref 8–16)
AST SERPL-CCNC: 73 U/L (ref 15–37)
BILIRUB SERPL-MCNC: 0.4 MG/DL (ref 0.2–1)
BUN SERPL-MCNC: 16.9 MG/DL (ref 7–18)
CALCIUM SERPL-MCNC: 8.8 MG/DL (ref 8.5–10.1)
CHLORIDE SERPL-SCNC: 104 MMOL/L (ref 98–107)
CO2 SERPL-SCNC: 25 MMOL/L (ref 21–32)
CREAT SERPL-MCNC: 1 MG/DL (ref 0.55–1.3)
DEPRECATED RDW RBC AUTO: 16.1 % (ref 11.9–15.9)
GLUCOSE SERPL-MCNC: 112 MG/DL (ref 74–106)
HCT VFR BLD CALC: 44 % (ref 35.4–49)
HGB BLD-MCNC: 14.6 GM/DL (ref 11.7–16.9)
MCH RBC QN AUTO: 30.9 PG (ref 25.7–33.7)
MCHC RBC AUTO-ENTMCNC: 33.2 G/DL (ref 32–35.9)
MCV RBC: 92.9 FL (ref 80–96)
PLATELET # BLD AUTO: 266 K/MM3 (ref 134–434)
PMV BLD: 7.1 FL (ref 7.5–11.1)
POTASSIUM SERPLBLD-SCNC: 4.3 MMOL/L (ref 3.5–5.1)
PROT SERPL-MCNC: 8.2 G/DL (ref 6.4–8.2)
RBC # BLD AUTO: 4.73 M/MM3 (ref 4–5.6)
SODIUM SERPL-SCNC: 134 MMOL/L (ref 136–145)
WBC # BLD AUTO: 4.7 K/MM3 (ref 4–10)

## 2020-02-21 RX ADMIN — Medication SCH TAB: at 10:03

## 2020-02-21 RX ADMIN — Medication SCH: at 22:25

## 2020-02-21 NOTE — EKG
Test Reason : 

Blood Pressure : ***/*** mmHG

Vent. Rate : 090 BPM     Atrial Rate : 090 BPM

   P-R Int : 148 ms          QRS Dur : 114 ms

    QT Int : 386 ms       P-R-T Axes : -36 -56 029 degrees

   QTc Int : 472 ms

 

UNUSUAL P AXIS, POSSIBLE ECTOPIC ATRIAL RHYTHM

LEFT AXIS DEVIATION

INCOMPLETE RIGHT BUNDLE BRANCH BLOCK

MINIMAL VOLTAGE CRITERIA FOR LVH, MAY BE NORMAL VARIANT

ABNORMAL ECG

 

Confirmed by MILADY ROBERT MD ([id number]) on 2/21/2020 10:45:17 AM

 

Referred By:             Confirmed By:MILADY ROBERT MD

## 2020-02-21 NOTE — PN
[facility] CIWA





- CIWA Score


Nausea/Vomitin-No Nausea/No Vomiting


Muscle Tremors: None


Anxiety: 2


Agitation: 0-Normal Activity


Paroxysmal Sweats: No Perspiration


Orientation: 0-Oriented


Tacttile Disturbances: 0-None


Auditory Disturbances: 0-None


Visual Disturbances: 1-Very Mild Sensitivity


Headache: 0-None Present


CIWA-Ar Total Score: 3





BHS Progress Note (SOAP)


Subjective: 





complains of mild light sensitivity


hx of depression, would like to see Psychiatry, hx of hospitalization one year 

ago


WAs told at [facility] that he has pneumonia, no current cough or fever


Objective: 





20 12:40


 Laboratory Tests











  20





  05:30 05:30


 


WBC  4.7 


 


RBC  4.73 


 


Hgb  14.6 


 


Hct  44.0 


 


MCV  92.9 


 


MCH  30.9 


 


MCHC  33.2 


 


RDW  16.1 H 


 


Plt Count  266 


 


MPV  7.1 L 


 


Sodium   134 L


 


Potassium   4.3


 


Chloride   104


 


Carbon Dioxide   25


 


Anion Gap   5 L


 


BUN   16.9


 


Creatinine   1.0


 


Est GFR (CKD-EPI)AfAm   95.06


 


Est GFR (CKD-EPI)NonAf   82.02


 


Random Glucose   112 H


 


Calcium   8.8


 


Total Bilirubin   0.4


 


AST   73 H


 


ALT   83 H


 


Alkaline Phosphatase   118 H


 


Total Protein   8.2


 


Albumin   3.8








 Vital Signs - 24 hr











  20





  14:28 18:03 00:56


 


Temperature 97.0 F L 97.0 F L 


 


Pulse Rate 94 H 103 H 


 


Respiratory 11 18 19





Rate   


 


Blood Pressure 116/67 137/78 














  20





  03:57 05:23 07:24


 


Temperature  97.7 F 


 


Pulse Rate  66 


 


Respiratory 18 16 





Rate   


 


Blood Pressure  90/49 L 106/63














  20





  09:06


 


Temperature 97.0 F L


 


Pulse Rate 77


 


Respiratory 18





Rate 


 


Blood Pressure 120/71








PE


Gnl: WDWN, in no distress


MS: awake, alert, nl language, no SI


HEENT: deformity over bridge of nose


Gait: ambulates well with rolling walker


Assessment: 





20 12:42


1. Alcohol use disorder


2. Hx of depression, no t on meds for at least 8 mos, [facility], Hx SA


3. Told to have pneumonia. 


Plan: 





1. Librium withdrawal protocol


2. Psychiatry consult


3. Chest xray ordered, not done yet

## 2020-02-22 RX ADMIN — Medication PRN MG: at 22:19

## 2020-02-22 RX ADMIN — Medication SCH MG: at 22:18

## 2020-02-22 RX ADMIN — Medication SCH TAB: at 10:04

## 2020-02-22 NOTE — PN
S CIWA





- CIWA Score


Nausea/Vomitin-No Nausea/No Vomiting


Muscle Tremors: None


Anxiety: 3


Agitation: 1-Slight > Activity


Paroxysmal Sweats: 2


Orientation: 0-Oriented


Tacttile Disturbances: 0-None


Auditory Disturbances: 0-None


Visual Disturbances: 0-None


Headache: 2-Mild


CIWA-Ar Total Score: 8





BHS Progress Note (SOAP)


Subjective: 





c/o headache, sweats,  and anxiety.


Objective: 





20 09:41


 Vital Signs











  20





  03:30 07:19 08:36


 


Temperature  98.1 F 96.6 F L


 


Pulse Rate  76 78


 


Respiratory 18 16 18





Rate   


 


Blood Pressure  105/58 L 149/79








 Laboratory Last Values











WBC  4.7 K/mm3 (4.0-10.0)   20  05:30    


 


RBC  4.73 M/mm3 (4.00-5.60)   20  05:30    


 


Hgb  14.6 GM/dL (11.7-16.9)   20  05:30    


 


Hct  44.0 % (35.4-49)   20  05:30    


 


MCV  92.9 fl (80-96)   20  05:30    


 


MCH  30.9 pg (25.7-33.7)   20  05:30    


 


MCHC  33.2 g/dl (32.0-35.9)   20  05:30    


 


RDW  16.1 % (11.9-15.9)  H  20  05:30    


 


Plt Count  266 K/MM3 (134-434)   20  05:30    


 


MPV  7.1 fl (7.5-11.1)  L  20  05:30    


 


Sodium  134 mmol/L (136-145)  L  20  05:30    


 


Potassium  4.3 mmol/L (3.5-5.1)   20  05:30    


 


Chloride  104 mmol/L ()   20  05:30    


 


Carbon Dioxide  25 mmol/L (21-32)   20  05:30    


 


Anion Gap  5 MMOL/L (8-16)  L  20  05:30    


 


BUN  16.9 mg/dL (7-18)   20  05:30    


 


Creatinine  1.0 mg/dL (0.55-1.3)   20  05:30    


 


Est GFR (CKD-EPI)AfAm  95.06   20  05:30    


 


Est GFR (CKD-EPI)NonAf  82.02   20  05:30    


 


Random Glucose  112 mg/dL ()  H  20  05:30    


 


Calcium  8.8 mg/dL (8.5-10.1)   20  05:30    


 


Total Bilirubin  0.4 mg/dL (0.2-1)   20  05:30    


 


AST  73 U/L (15-37)  H  20  05:30    


 


ALT  83 U/L (13-61)  H  20  05:30    


 


Alkaline Phosphatase  118 U/L ()  H  20  05:30    


 


Total Protein  8.2 g/dl (6.4-8.2)   20  05:30    


 


Albumin  3.8 g/dl (3.4-5.0)   20  05:30    


 


RPR Titer  Nonreactive  (NONREACTIVE)   20  05:30    








Labs noted.


Assessment: 





20 09:41


AOX3, in no acute respiratory distress.


Full ROM, ambulating in the unit.


Withdrawal symptoms.


Plan: 





continue detox.

## 2020-02-23 RX ADMIN — METHOCARBAMOL PRN MG: 500 TABLET ORAL at 22:17

## 2020-02-23 RX ADMIN — HYDROXYZINE PAMOATE PRN MG: 25 CAPSULE ORAL at 22:17

## 2020-02-23 RX ADMIN — Medication SCH MG: at 22:17

## 2020-02-23 RX ADMIN — Medication SCH TAB: at 10:05

## 2020-02-23 NOTE — PN
[facility] CIWA





- CIWA Score


Nausea/Vomitin-No Nausea/No Vomiting


Muscle Tremors: 2


Anxiety: 1-Mildly Anxious


Agitation: 1-Slight > Activity


Paroxysmal Sweats: 1-Minimal Palms Moist


Orientation: 0-Oriented


Tacttile Disturbances: 0-None


Auditory Disturbances: 0-None


Visual Disturbances: 0-None


Headache: 0-None Present


CIWA-Ar Total Score: 5





[facility] Progress Note (SOAP)


Subjective: 





59 years old male admitted on 20 for alcohol withdrawal sx management


treating with librium detox regiment


feeling ok today ambulating with walker slow but steady 


attending behavior and psychosocial therapies groups and meetings 


Objective: 





20 11:01


 Vital Signs











Temperature  96.6 F L  20 08:42


 


Pulse Rate  73   20 08:42


 


Respiratory Rate  18   20 08:42


 


Blood Pressure  104/64   20 08:42


 


O2 Sat by Pulse Oximetry (%)      








 Laboratory Last Values











WBC  4.7 K/mm3 (4.0-10.0)   20  05:30    


 


RBC  4.73 M/mm3 (4.00-5.60)   20  05:30    


 


Hgb  14.6 GM/dL (11.7-16.9)   20  05:30    


 


Hct  44.0 % (35.4-49)   20  05:30    


 


MCV  92.9 fl (80-96)   20  05:30    


 


MCH  30.9 pg (25.7-33.7)   20  05:30    


 


MCHC  33.2 g/dl (32.0-35.9)   20  05:30    


 


RDW  16.1 % (11.9-15.9)  H  20  05:30    


 


Plt Count  266 K/MM3 (134-434)   20  05:30    


 


MPV  7.1 fl (7.5-11.1)  L  20  05:30    


 


Sodium  134 mmol/L (136-145)  L  20  05:30    


 


Potassium  4.3 mmol/L (3.5-5.1)   20  05:30    


 


Chloride  104 mmol/L ()   20  05:30    


 


Carbon Dioxide  25 mmol/L (21-32)   20  05:30    


 


Anion Gap  5 MMOL/L (8-16)  L  20  05:30    


 


BUN  16.9 mg/dL (7-18)   20  05:30    


 


Creatinine  1.0 mg/dL (0.55-1.3)   20  05:30    


 


Est GFR (CKD-EPI)AfAm  95.06   20  05:30    


 


Est GFR (CKD-EPI)NonAf  82.02   20  05:30    


 


Random Glucose  112 mg/dL ()  H  20  05:30    


 


Calcium  8.8 mg/dL (8.5-10.1)   20  05:30    


 


Total Bilirubin  0.4 mg/dL (0.2-1)   20  05:30    


 


AST  73 U/L (15-37)  H  20  05:30    


 


ALT  83 U/L (13-61)  H  20  05:30    


 


Alkaline Phosphatase  118 U/L ()  H  20  05:30    


 


Total Protein  8.2 g/dl (6.4-8.2)   20  05:30    


 


Albumin  3.8 g/dl (3.4-5.0)   20  05:30    


 


RPR Titer  Nonreactive  (NONREACTIVE)   20  05:30    








lab noted


Assessment: 





20 11:01


alcohol withdrawal 


Plan: 





librium regiment

## 2020-02-24 RX ADMIN — Medication PRN MG: at 22:12

## 2020-02-24 RX ADMIN — Medication SCH TAB: at 10:02

## 2020-02-24 RX ADMIN — HYDROXYZINE PAMOATE PRN MG: 25 CAPSULE ORAL at 22:11

## 2020-02-24 RX ADMIN — Medication SCH MG: at 22:11

## 2020-02-24 RX ADMIN — METHOCARBAMOL PRN MG: 500 TABLET ORAL at 22:11

## 2020-02-24 NOTE — PN
[facility] CIWA





- CIWA Score


Nausea/Vomitin-No Nausea/No Vomiting


Muscle Tremors: 1-None Visible, but Felt


Anxiety: 0-No Anxiety, at Ease


Agitation: 0-Normal Activity


Paroxysmal Sweats: 1-Minimal Palms Moist


Orientation: 0-Oriented


Tacttile Disturbances: 0-None


Auditory Disturbances: 0-None


Visual Disturbances: 1-Very Mild Sensitivity


Headache: 0-None Present


CIWA-Ar Total Score: 3





BHS Progress Note (SOAP)


Subjective: 





59 years old male admitted on 20 for alcohol withdrawal sx management 

treating with librium detox regiment


feeling better today less tremor ambulating with walker steady gait discuss 

aftercare with staff 


Objective: 





20 10:26


 Vital Signs











Temperature  97.0 F L  20 08:30


 


Pulse Rate  72   20 08:30


 


Respiratory Rate  17   20 08:30


 


Blood Pressure  102/59 L  20 08:30


 


O2 Sat by Pulse Oximetry (%)      








 Laboratory Last Values











WBC  4.7 K/mm3 (4.0-10.0)   20  05:30    


 


RBC  4.73 M/mm3 (4.00-5.60)   20  05:30    


 


Hgb  14.6 GM/dL (11.7-16.9)   20  05:30    


 


Hct  44.0 % (35.4-49)   20  05:30    


 


MCV  92.9 fl (80-96)   20  05:30    


 


MCH  30.9 pg (25.7-33.7)   20  05:30    


 


MCHC  33.2 g/dl (32.0-35.9)   20  05:30    


 


RDW  16.1 % (11.9-15.9)  H  20  05:30    


 


Plt Count  266 K/MM3 (134-434)   20  05:30    


 


MPV  7.1 fl (7.5-11.1)  L  20  05:30    


 


Sodium  134 mmol/L (136-145)  L  20  05:30    


 


Potassium  4.3 mmol/L (3.5-5.1)   20  05:30    


 


Chloride  104 mmol/L ()   20  05:30    


 


Carbon Dioxide  25 mmol/L (21-32)   20  05:30    


 


Anion Gap  5 MMOL/L (8-16)  L  20  05:30    


 


BUN  16.9 mg/dL (7-18)   20  05:30    


 


Creatinine  1.0 mg/dL (0.55-1.3)   20  05:30    


 


Est GFR (CKD-EPI)AfAm  95.06   20  05:30    


 


Est GFR (CKD-EPI)NonAf  82.02   20  05:30    


 


Random Glucose  112 mg/dL ()  H  20  05:30    


 


Calcium  8.8 mg/dL (8.5-10.1)   20  05:30    


 


Total Bilirubin  0.4 mg/dL (0.2-1)   20  05:30    


 


AST  73 U/L (15-37)  H  20  05:30    


 


ALT  83 U/L (13-61)  H  20  05:30    


 


Alkaline Phosphatase  118 U/L ()  H  20  05:30    


 


Total Protein  8.2 g/dl (6.4-8.2)   20  05:30    


 


Albumin  3.8 g/dl (3.4-5.0)   20  05:30    


 


RPR Titer  Nonreactive  (NONREACTIVE)   20  05:30    








lab noted


Assessment: 





20 10:26


alcohol withdrawal 


Plan: 





librium regiment

## 2020-02-25 ENCOUNTER — HOSPITAL ENCOUNTER (INPATIENT)
Dept: HOSPITAL 74 - YASAS | Age: 60
LOS: 10 days | Discharge: HOME | DRG: 895 | End: 2020-03-06
Attending: ALLERGY & IMMUNOLOGY | Admitting: ALLERGY & IMMUNOLOGY
Payer: COMMERCIAL

## 2020-02-25 VITALS — DIASTOLIC BLOOD PRESSURE: 65 MMHG | SYSTOLIC BLOOD PRESSURE: 128 MMHG | HEART RATE: 80 BPM | TEMPERATURE: 97.3 F

## 2020-02-25 DIAGNOSIS — Z91.410: ICD-10-CM

## 2020-02-25 DIAGNOSIS — G47.00: ICD-10-CM

## 2020-02-25 DIAGNOSIS — Z99.89: ICD-10-CM

## 2020-02-25 DIAGNOSIS — Z88.6: ICD-10-CM

## 2020-02-25 DIAGNOSIS — F10.20: Primary | ICD-10-CM

## 2020-02-25 DIAGNOSIS — Z95.1: ICD-10-CM

## 2020-02-25 DIAGNOSIS — Z96.643: ICD-10-CM

## 2020-02-25 DIAGNOSIS — I25.10: ICD-10-CM

## 2020-02-25 DIAGNOSIS — J45.909: ICD-10-CM

## 2020-02-25 DIAGNOSIS — Z86.19: ICD-10-CM

## 2020-02-25 DIAGNOSIS — Z91.5: ICD-10-CM

## 2020-02-25 DIAGNOSIS — F10.24: ICD-10-CM

## 2020-02-25 DIAGNOSIS — L85.3: ICD-10-CM

## 2020-02-25 PROCEDURE — HZ42ZZZ GROUP COUNSELING FOR SUBSTANCE ABUSE TREATMENT, COGNITIVE-BEHAVIORAL: ICD-10-PCS | Performed by: ALLERGY & IMMUNOLOGY

## 2020-02-25 RX ADMIN — Medication PRN MG: at 21:14

## 2020-02-25 RX ADMIN — Medication SCH MG: at 21:14

## 2020-02-25 RX ADMIN — Medication SCH TAB: at 10:13

## 2020-02-25 NOTE — DS
[facility] Detox Discharge Summary


Admission Date: 


20





Discharge Date: 20





- History


Present History: Alcohol Dependence


Additional Comments: 





59 years old male admitted on 20 for alcohol withdrawal sx management 

treated with librium detox regiment 


Mr Padron has completed the librium regiment and is tolerated well


alert oriented x 3 


seen by psychiatrist no medical intervention at this time


respiratory clear lungs bilaterally on auscultation 


ambulating with walker steady gait


abdomen soft no rebound tenderness


skin warm and dry


Pertinent Past History: 





time for discharge 42 minutes





- Physical Exam Results


Vital Signs: 


 Vital Signs











Temperature  96.4 F L  20 08:44


 


Pulse Rate  78   20 08:44


 


Respiratory Rate  18   20 08:44


 


Blood Pressure  131/73   20 08:44


 


O2 Sat by Pulse Oximetry (%)      











Pertinent Admission Physical Exam Findings: 





alcohol withdrawal 


 Vital Signs











Temperature  97.3 F L  20 12:35


 


Pulse Rate  80   20 12:35


 


Respiratory Rate  18   20 12:35


 


Blood Pressure  128/65   20 12:35


 


O2 Sat by Pulse Oximetry (%)      








 Laboratory Last Values











WBC  4.7 K/mm3 (4.0-10.0)   20  05:30    


 


RBC  4.73 M/mm3 (4.00-5.60)   20  05:30    


 


Hgb  14.6 GM/dL (11.7-16.9)   20  05:30    


 


Hct  44.0 % (35.4-49)   20  05:30    


 


MCV  92.9 fl (80-96)   20  05:30    


 


MCH  30.9 pg (25.7-33.7)   20  05:30    


 


MCHC  33.2 g/dl (32.0-35.9)   20  05:30    


 


RDW  16.1 % (11.9-15.9)  H  20  05:30    


 


Plt Count  266 K/MM3 (134-434)   20  05:30    


 


MPV  7.1 fl (7.5-11.1)  L  20  05:30    


 


Sodium  134 mmol/L (136-145)  L  20  05:30    


 


Potassium  4.3 mmol/L (3.5-5.1)   20  05:30    


 


Chloride  104 mmol/L ()   20  05:30    


 


Carbon Dioxide  25 mmol/L (21-32)   20  05:30    


 


Anion Gap  5 MMOL/L (8-16)  L  20  05:30    


 


BUN  16.9 mg/dL (7-18)   20  05:30    


 


Creatinine  1.0 mg/dL (0.55-1.3)   20  05:30    


 


Est GFR (CKD-EPI)AfAm  95.06   20  05:30    


 


Est GFR (CKD-EPI)NonAf  82.02   20  05:30    


 


Random Glucose  112 mg/dL ()  H  20  05:30    


 


Calcium  8.8 mg/dL (8.5-10.1)   20  05:30    


 


Total Bilirubin  0.4 mg/dL (0.2-1)   20  05:30    


 


AST  73 U/L (15-37)  H  20  05:30    


 


ALT  83 U/L (13-61)  H  20  05:30    


 


Alkaline Phosphatase  118 U/L ()  H  20  05:30    


 


Total Protein  8.2 g/dl (6.4-8.2)   20  05:30    


 


Albumin  3.8 g/dl (3.4-5.0)   20  05:30    


 


RPR Titer  Nonreactive  (NONREACTIVE)   20  05:30    








lab noted





- Treatment


Hospital Course: Detox Protocol Followed, Detoxed Safely, Responded well, 

Discharged Condition Good, Rehab Referral Accepted


Patient [facility] 





- Medication


Discharge Medications: 


Ambulatory Orders





NK [No Known Home Medication]  18 











- Diagnosis


(1) Alcohol dependence with uncomplicated withdrawal


Current Visit: Yes   Status: Acute   





(2) Substance induced mood disorder


Current Visit: Yes   Status: Suspected   





(3) Asthma


Current Visit: Yes   Status: Chronic   


Qualifiers: 


   Asthma severity: mild   Asthma persistence: intermittent   Asthma 

complication type: uncomplicated   Qualified Code(s): J45.20 - Mild 

intermittent asthma, uncomplicated   





- AMA


Did Patient Leave Against Medical Advice: No





CIWA Score





- CIWA Score


Nausea/Vomitin-No Nausea/No Vomiting


Muscle Tremors: 1-None Visible, but Felt


Anxiety: 0-No Anxiety, at Ease


Agitation: 0-Normal Activity


Paroxysmal Sweats: 1-Minimal Palms Moist


Orientation: 0-Oriented


Tacttile Disturbances: 0-None


Auditory Disturbances: 0-None


Visual Disturbances: 0-None


Headache: 0-None Present


CIWA-Ar Total Score: 2

## 2020-02-25 NOTE — HP
[facility] MD Rehab Assess/Revision





- Admission History


[facility]


Date of Admission to Rehab: 02/25/20





- Findings


Detox History & Physical reviewed: Yes


Concur with findings: Yes


Comments/Additional Findings: transferred from detox to rehab admission as per 

protocol





Inpatient Rehab Admission





- Rehab Decision to Admit


Inpatient rehab admission?: Yes





- Initial Determination


Are CD services needed?: Yes


Free of communicable disease: Yes


Not in need of hospitalization: Yes





- Rehab Admission Criteria


Previous failed treatment: Yes


Poor recovery environment: Yes


Comorbidities: Yes


Lacks judgement: Yes


Patient is meeting Inpatient Rehab admission criteria:: Yes

## 2020-02-26 RX ADMIN — Medication SCH TAB: at 10:53

## 2020-02-26 RX ADMIN — Medication PRN MG: at 21:20

## 2020-02-26 RX ADMIN — Medication SCH MG: at 21:20
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
## 2020-02-27 RX ADMIN — Medication SCH TAB: at 10:11

## 2020-02-27 RX ADMIN — Medication PRN MG: at 21:17

## 2020-02-27 RX ADMIN — Medication SCH MG: at 21:16

## 2020-02-28 RX ADMIN — Medication PRN MG: at 21:08

## 2020-02-28 RX ADMIN — Medication SCH: at 16:38

## 2020-02-28 RX ADMIN — Medication SCH TAB: at 10:05

## 2020-02-28 RX ADMIN — Medication SCH MG: at 21:08

## 2020-02-29 RX ADMIN — Medication SCH MG: at 21:17

## 2020-02-29 RX ADMIN — Medication PRN MG: at 21:17

## 2020-02-29 RX ADMIN — Medication SCH: at 10:13

## 2020-02-29 RX ADMIN — Medication SCH TAB: at 10:13

## 2020-03-01 RX ADMIN — Medication SCH TAB: at 09:52

## 2020-03-01 RX ADMIN — Medication SCH: at 09:52

## 2020-03-01 RX ADMIN — Medication PRN MG: at 21:13

## 2020-03-01 RX ADMIN — Medication SCH MG: at 21:13

## 2020-03-02 RX ADMIN — Medication SCH: at 11:10

## 2020-03-02 RX ADMIN — Medication PRN MG: at 21:14

## 2020-03-02 RX ADMIN — Medication SCH MG: at 21:14

## 2020-03-03 RX ADMIN — Medication SCH MG: at 21:09

## 2020-03-03 RX ADMIN — Medication SCH TAB: at 10:17

## 2020-03-03 RX ADMIN — Medication PRN MG: at 21:09

## 2020-03-03 RX ADMIN — Medication SCH APPLIC: at 10:17

## 2020-03-04 RX ADMIN — Medication SCH MG: at 21:10

## 2020-03-04 RX ADMIN — Medication SCH: at 09:59

## 2020-03-04 RX ADMIN — Medication SCH TAB: at 09:59

## 2020-03-04 RX ADMIN — Medication PRN MG: at 21:10

## 2020-03-05 VITALS — HEART RATE: 69 BPM

## 2020-03-05 RX ADMIN — Medication SCH MG: at 21:05

## 2020-03-05 RX ADMIN — Medication SCH TAB: at 10:44

## 2020-03-05 RX ADMIN — Medication PRN MG: at 21:05

## 2020-03-05 RX ADMIN — Medication SCH: at 10:44

## 2020-03-05 NOTE — DS
[facility] Rehab Discharge Summary





- [facility] Rehab Discharge Summary


Admission Date: 02/25/20


Discharge Date: 03/06/20





- History


Present History: Alcohol dependence


Additional Comments: 





Pt is a 60 y/o male with a hx of TEENA admitted to rehab abd scheduled for 

discharge on 3/6/20. Pt has been referred to [facility] for CD aftercare. Pt reports he has a primary care provider, Dr. Ricardo Edwards at [place], [place] 


Pertinent Past History: 





Asthma


CAD-CABG


Jesse. Hip Replacement


Use of Walker for ambulation


Mood Disorder





- Discharge Physical Exam


Vital Signs: 


                                   Vital Signs











Temperature  97.8 F   03/05/20 07:12


 


Pulse Rate  69   03/05/20 07:12


 


Respiratory Rate  18   03/05/20 07:12


 


Blood Pressure  123/72   03/05/20 07:12


 


O2 Sat by Pulse Oximetry (%)      








Alert o x 3,denies s/h/i


nad


oob ambulating with walker


cardiac:s1 s2,rrr


lungs:cta,jesse.


abdomen:+bs,soft,nt,nd


extremities/skin:no edema;skin intact.





Pertinent Admission Physical Exam Findings: 





Status stable and unchanged





- Treatment


Discharge Condition: Discharge condition good


Hospital Course: 





Rehabilitate safely


Responded well


CD aftercare referral accepted


attended and participated in groups and individual sessions while in treatment.





- Medication


Discharge Medications: 


Ambulatory Orders





NK [No Known Home Medication]  02/14/18 











- Medication-Assisted Treatment (MAT)


Medication-Assisted Treatment (MAT): No





- Discharge Instructions


Diet, activity, other medical instructions: 





Diet:Regular





Activity: oob ad bethel with walker





Other medical instructions:follow up with CD aftercare recommendations as 

scheduled.


follow up with primary care provider within 1 week after discharge.








- Diagnosis


(1) Walker as ambulation aid


Status: Chronic   





(2) Alcohol dependence


Status: Chronic   


Qualifiers: 


   Substance use status: uncomplicated   Qualified Code(s): F10.20 - Alcohol 

dependence, uncomplicated   





(3) Asthma


Status: Chronic   


Qualifiers: 


   Asthma severity: mild   Asthma persistence: unspecified   Asthma complication

type: unspecified   Qualified Code(s): J45.909 - Unspecified asthma, 

uncomplicated   





(4) H/O bilateral hip replacements


Status: Chronic   





- Follow-up Referral


Minutes to complete discharge: 25





- AMA


Did Patient Leave Against Medical Advice: No

## 2020-03-06 VITALS — TEMPERATURE: 97.5 F | SYSTOLIC BLOOD PRESSURE: 105 MMHG | DIASTOLIC BLOOD PRESSURE: 65 MMHG

## 2020-06-30 ENCOUNTER — HOSPITAL ENCOUNTER (INPATIENT)
Dept: HOSPITAL 74 - YASAS | Age: 60
LOS: 5 days | Discharge: OTHER INSTITUTION | DRG: 897 | End: 2020-07-05
Attending: ALLERGY & IMMUNOLOGY | Admitting: ALLERGY & IMMUNOLOGY
Payer: COMMERCIAL

## 2020-06-30 VITALS — BODY MASS INDEX: 26.1 KG/M2

## 2020-06-30 DIAGNOSIS — F32.9: ICD-10-CM

## 2020-06-30 DIAGNOSIS — G47.00: ICD-10-CM

## 2020-06-30 DIAGNOSIS — Z86.19: ICD-10-CM

## 2020-06-30 DIAGNOSIS — Z59.0: ICD-10-CM

## 2020-06-30 DIAGNOSIS — F10.24: ICD-10-CM

## 2020-06-30 DIAGNOSIS — Z56.0: ICD-10-CM

## 2020-06-30 DIAGNOSIS — F19.24: ICD-10-CM

## 2020-06-30 DIAGNOSIS — F10.282: ICD-10-CM

## 2020-06-30 DIAGNOSIS — B35.1: ICD-10-CM

## 2020-06-30 DIAGNOSIS — Z98.890: ICD-10-CM

## 2020-06-30 DIAGNOSIS — Z91.410: ICD-10-CM

## 2020-06-30 DIAGNOSIS — R01.1: ICD-10-CM

## 2020-06-30 DIAGNOSIS — Z96.642: ICD-10-CM

## 2020-06-30 DIAGNOSIS — Z95.1: ICD-10-CM

## 2020-06-30 DIAGNOSIS — Z99.89: ICD-10-CM

## 2020-06-30 DIAGNOSIS — J45.909: ICD-10-CM

## 2020-06-30 DIAGNOSIS — F10.230: Primary | ICD-10-CM

## 2020-06-30 DIAGNOSIS — Z96.641: ICD-10-CM

## 2020-06-30 LAB
ALBUMIN SERPL-MCNC: 3.9 G/DL (ref 3.4–5)
ALP SERPL-CCNC: 144 U/L (ref 45–117)
ALT SERPL-CCNC: 33 U/L (ref 13–61)
ANION GAP SERPL CALC-SCNC: 5 MMOL/L (ref 8–16)
AST SERPL-CCNC: 24 U/L (ref 15–37)
BILIRUB SERPL-MCNC: 0.4 MG/DL (ref 0.2–1)
BUN SERPL-MCNC: 9.9 MG/DL (ref 7–18)
CALCIUM SERPL-MCNC: 8.4 MG/DL (ref 8.5–10.1)
CHLORIDE SERPL-SCNC: 106 MMOL/L (ref 98–107)
CO2 SERPL-SCNC: 28 MMOL/L (ref 21–32)
CREAT SERPL-MCNC: 0.9 MG/DL (ref 0.55–1.3)
DEPRECATED RDW RBC AUTO: 16.6 % (ref 11.9–15.9)
GLUCOSE SERPL-MCNC: 143 MG/DL (ref 74–106)
HCT VFR BLD CALC: 45.1 % (ref 35.4–49)
HGB BLD-MCNC: 14.8 GM/DL (ref 11.7–16.9)
MCH RBC QN AUTO: 30.7 PG (ref 25.7–33.7)
MCHC RBC AUTO-ENTMCNC: 32.8 G/DL (ref 32–35.9)
MCV RBC: 93.6 FL (ref 80–96)
PLATELET # BLD AUTO: 254 K/MM3 (ref 134–434)
PMV BLD: 7.2 FL (ref 7.5–11.1)
POTASSIUM SERPLBLD-SCNC: 4.4 MMOL/L (ref 3.5–5.1)
PROT SERPL-MCNC: 8 G/DL (ref 6.4–8.2)
RBC # BLD AUTO: 4.82 M/MM3 (ref 4–5.6)
SODIUM SERPL-SCNC: 139 MMOL/L (ref 136–145)
WBC # BLD AUTO: 5.4 K/MM3 (ref 4–10)

## 2020-06-30 PROCEDURE — HZ2ZZZZ DETOXIFICATION SERVICES FOR SUBSTANCE ABUSE TREATMENT: ICD-10-PCS | Performed by: ALLERGY & IMMUNOLOGY

## 2020-06-30 PROCEDURE — U0003 INFECTIOUS AGENT DETECTION BY NUCLEIC ACID (DNA OR RNA); SEVERE ACUTE RESPIRATORY SYNDROME CORONAVIRUS 2 (SARS-COV-2) (CORONAVIRUS DISEASE [COVID-19]), AMPLIFIED PROBE TECHNIQUE, MAKING USE OF HIGH THROUGHPUT TECHNOLOGIES AS DESCRIBED BY CMS-2020-01-R: HCPCS

## 2020-06-30 RX ADMIN — HYDROXYZINE PAMOATE SCH: 25 CAPSULE ORAL at 13:04

## 2020-06-30 RX ADMIN — HYDROXYZINE PAMOATE SCH MG: 25 CAPSULE ORAL at 22:03

## 2020-06-30 RX ADMIN — HYDROXYZINE PAMOATE SCH MG: 25 CAPSULE ORAL at 17:23

## 2020-06-30 RX ADMIN — Medication SCH MG: at 22:03

## 2020-06-30 RX ADMIN — Medication SCH TAB: at 12:01

## 2020-06-30 SDOH — ECONOMIC STABILITY - HOUSING INSECURITY: HOMELESSNESS: Z59.0

## 2020-06-30 SDOH — ECONOMIC STABILITY - INCOME SECURITY: UNEMPLOYMENT, UNSPECIFIED: Z56.0

## 2020-06-30 NOTE — HP
CIWA Score


Nausea/Vomitin-No Nausea/No Vomiting


Muscle Tremors: 1-None Visible, but Felt


Anxiety: 4-Mod. Anxious/Guarded


Agitation: 3


Paroxysmal Sweats: 4-Forehead w/Sweat Beads


Orientation: 0-Oriented


Tacttile Disturbances: 2-Mild Itch/Numbness/Burn


Auditory Disturbances: 0-None


Visual Disturbances: 0-None


Headache: 0-None Present


CIWA-Ar Total Score: 14





- Admission Criteria


[facility] Guidelines: Admission for Medically Managed Detox: 


Requires at least one of the followin. CIWA greater than 12


2. Seizures within the past 24 hours


3. Delirium tremens within the past 24 hours


4. Hallucinations within the past 24 hours


5. Acute intervention needed for co  occurring medical disorder


6. Acute intervention needed for co  occurring psychiatric disorder


7. Severe withdrawal that cannot be handled at a lower level of care (continued


    vomiting, continued diarrhea, abnormal vital signs) requiring intravenous


    medication and/or fluids


8. Pregnancy








Admitting History and Physical





- Admission


Chief Complaint: Mr. Padron is a 60 yo man who presents to [facility] stating "I

am an alcoholic, I passed out and woke up in [place]" "I was lonely".  He is 

requesting admission to detox.


History of Present Illness: 


Mr. Padron is a 60 yo man who presents to [facility] stating "I am an alcoholic,

I passed out and woke up in [place]" "I was lonely". 


His last admission to [facility] was [facility]  and 3/5/20 for detox and a short

rehab stay.  He left and relapsed 2-3 days later.   





Review of [place] notes: reason for admission "loneliness kills the soul, I was 

drinking" reports fall; no visible injuries.  Dx: alcohol abuse.





PMH: ? stroke 2020: bilateral finger numbness


PSH: B/L hip replacements, CABG, sinus, right inguinal hernia, vocal cord polyp

ectomy


PSYCH: MDD, last took Thorazine and Seroquel months ago


SOC: homeless, [place] but lost bed


legal: none








Substance Use History


  ** Alcohol


Substance amount: 2 pints vodka


Frequency of use: Daily


Substance route: Oral


Date of Last Use: 20


History Source: Patient


Limitations to Obtaining History: No Limitations





- Smoking History


Smoking history: Never smoked


Have you smoked in the past 12 months: No


Aproximately how many cigarettes per day: 5


If you are a former smoker, when did you quit?: 





- Alcohol/Substance Use


Hx Alcohol Use: Yes





Admission [facility]





- [facility]


Allergies/Adverse Reactions: 


                                    Allergies











Allergy/AdvReac Type Severity Reaction Status Date / Time


 


naproxen [From Naprosyn] Allergy Severe Rash Verified 20 14:28











Exam Limitations: No Limitations





- Ebola screening


Have you traveled outside of the country in the last 21 days: No


Have you been sick,other than usual withdrawal symptoms: No


Do you have a fever: No





- Review of Systems


Constitutional: No Symptoms Reported


EENT: reports: No Symptoms Reported, Blurred Vision (needs glasses for distance)


Respiratory: reports: No Symptoms reported


Cardiac: reports: No Symptoms Reported


GI: reports: No Symptoms Reported


: reports: No Symptoms Reported


Musculoskeletal: reports: Other (s/p jesse hip surgery and uses walker since that 

time, denies pain)


Integumentary: reports: Other (toenails with fugus, feet calloused)


Neuro: reports: No Symptoms reported


Endocrine: reports: No Symptoms Reported


Hematology: reports: No Symptoms Reported


Psychiatric: reports: Depressed





Patient History





- Patient Medical History


Hx Anemia: No


Hx Asthma: No


Hx Chronic Obstructive Pulmonary Disease (COPD): No


Hx Cancer: No


Hx Cardiac Disorders: Yes (murmur)


Hx Congestive Heart Failure: No


Hx Hypertension: No


Hx Hypercholesterolemia: No


Hx Pacemaker: No


HX Cerebrovascular Accident: No


Hx Seizures: No


Hx Dementia: No


Hx Diabetes: No


Hx Gastrointestinal Disorders: No


Hx Liver Disease: No


Hx Genitourinary Disorders: No


Hx Sexually Transmitted Disorders: Yes (gonorrhea)


Hx Renal Disease (ESRD): No


Hx Thyroid Disease: No


Hx Human Immunodeficiency Virus (HIV): No (negative)


Hx Hepatitis C: No (negative)


Hx Depression: Yes (was on wellbutrin)


Hx Suicide Attempt: Yes (tried to slice wrist 2yrs ago, denies any current 

ideation)


Hx Bipolar Disorder: No


Hx Schizophrenia: No





- Patient Surgical History


Past Surgical History: Yes


Hx Neurologic Surgery: No


Hx Cataract Extraction: No


Hx Cardiac Surgery: Yes (open heart)


Hx Lung Surgery: No


Hx Breast Surgery: No


Hx Breast Biopsy: No


Hx Abdominal Surgery: Yes (hernia repair)


Hx Appendectomy: No


Hx Cholecystectomy: No


Hx Genitourinary Surgery: No


Hx  Section: No


Hx Orthopedic Surgery: Yes (Lt. hip prosthesis,metal plate rt. thigh)


Other Surgical History: sinus,vocal chord


Anesthesia Reaction: No





- PPD History


Date: 20


Results: 0mm





- Smoking Cessation


Smoking history: Former smoker


Have you smoked in the past 12 months: No


Aproximately how many cigarettes per day: 5


If you are a former smoker, when did you quit?: 


Hx Chewing Tobacco Use: No


Initiated information on smoking cessation: No





- Substances abused


  ** Alcohol


Substance route: Oral


Frequency: Daily


Amount used: 2/3 pints of beer


Age of first use: 16


Date of last use: 20





Admission Physical Exam [facility]





- Physical


General Appearance: Yes: Nourished, Appropriately Dressed, Mild Distress


HEENTM: Yes: Hearing grossly Normal, Normocephalic, Normal Voice, Other (nasal 

deformity s/p fracture)


Respiratory: Yes: Lungs Clear, Normal Breath Sounds, No Accessory Muscle Use


Neck: Yes: Within Normal Limits, Supple


Breast: Yes: Breast Exam Deferred


Cardiology: Yes: Regular Rhythm, Regular Rate, S1, S2


Abdominal: Yes: Non Tender, Flat, Soft, Decreased BS


Genitourinary: Yes: Other (deferred)


Back: Yes: Normal Inspection


Musculoskeletal: Yes: Other (walker to ambulate, steady with walker)


Neurological: Yes: Alert, Normal Mood/Affect, Normal Response


Integumentary: Yes: Normal Color, Dry, Warm, Cyanotic, Other (toe nail with 

fungal changes.  Soles are calloused)





- Diagnostic


(1) Alcohol dependence with uncomplicated withdrawal


Current Visit: Yes   Status: Acute   





(2) Depression


Current Visit: No   Status: Chronic   





(3) Walker as ambulation aid


Current Visit: Yes   Status: Chronic   





Cleared for Admission [facility]





- Detox or Rehab


[facility] Level of Care: Medically Managed


Detox Regimen/Protocol: Librium





Breathalyzer





- Breathalyzer


Breathalyzer: 0





Urine Drug Screen





- Test Device


Lot number: [id number]


Expiration date: 21





- Control


Is test valid?: Yes





- Results


Drug screen NEGATIVE: Yes





Inpatient Rehab Admission





- Rehab Decision to Admit


Inpatient rehab admission?: No
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
## 2020-06-30 NOTE — BHS.RME
Substance Use & Tx History





- Substance Use History


  ** Alcohol


Substance amount: 2 pints vodka


Frequency of use: Daily


Substance route: Oral


Date of Last Use: 20





Physical/Psych/Mental Status





- Behavior


General Behavior: Increased activity (restlessness, agitation)


Eye Contact: Normal





- Cooperativeness


Cooperativeness: Cooperative





- Thinking


Thought Processes: Tight, Logical, Goal Directed





- Physical Health Problems


Is patient presently having any pain?: No


Does patient presently have any injuries (include location): No


Does patient currently have a fever: No


Is patient pregnant: No





CIWA


Nausea/Vomitin-No Nausea/No Vomiting


Muscle Tremors: 1-None Visible, but Felt


Anxiety: 4-Mod. Anxious/Guarded


Agitation: 3


Paroxysmal Sweats: 4-Forehead w/Sweat Beads


Orientation: 0-Oriented


Tacttile Disturbances: 2-Mild Itch/Numbness/Burn


Auditory Disturbances: 0-None


Visual Disturbances: 0-None


Headache: 0-None Present


CIWA-Ar Total Score: 14

## 2020-07-01 LAB
APPEARANCE UR: CLEAR
BILIRUB UR STRIP.AUTO-MCNC: NEGATIVE MG/DL
COLOR UR: YELLOW
KETONES UR QL STRIP: NEGATIVE
LEUKOCYTE ESTERASE UR QL STRIP.AUTO: NEGATIVE
NITRITE UR QL STRIP: NEGATIVE
PH UR: 6.5 [PH] (ref 5–8)
PROT UR QL STRIP: NEGATIVE
PROT UR QL STRIP: NEGATIVE
SP GR UR: 1.04 (ref 1.01–1.03)
UROBILINOGEN UR STRIP-MCNC: 0.2 MG/DL (ref 0.2–1)

## 2020-07-01 RX ADMIN — Medication SCH TAB: at 10:33

## 2020-07-01 RX ADMIN — HYDROXYZINE PAMOATE SCH: 25 CAPSULE ORAL at 14:29

## 2020-07-01 RX ADMIN — Medication SCH MG: at 22:12

## 2020-07-01 RX ADMIN — HYDROXYZINE PAMOATE SCH: 25 CAPSULE ORAL at 22:12

## 2020-07-01 RX ADMIN — HYDROXYZINE PAMOATE SCH MG: 25 CAPSULE ORAL at 06:22

## 2020-07-01 RX ADMIN — HYDROXYZINE PAMOATE SCH MG: 25 CAPSULE ORAL at 10:33

## 2020-07-01 RX ADMIN — Medication SCH MG: at 22:11

## 2020-07-01 RX ADMIN — HYDROXYZINE PAMOATE SCH: 25 CAPSULE ORAL at 17:27

## 2020-07-01 NOTE — PN
S CIWA





- CIWA Score


Nausea/Vomitin-No Nausea/No Vomiting


Muscle Tremors: 3


Anxiety: 5


Agitation: 3


Paroxysmal Sweats: 1-Minimal Palms Moist


Orientation: 0-Oriented


Tacttile Disturbances: 0-None


Auditory Disturbances: 0-None


Visual Disturbances: 0-None


Headache: 0-None Present


CIWA-Ar Total Score: 12





BHS Progress Note (SOAP)


Subjective: 





pt is a 60 y/o male admitted to detox for alcohol withdrawals. Pt is aon Librium

regimen.


c/o anxiety and "depressed, that's all". Pt seen in bed, awake and quiet but 

responds pleasantly.Denies s/h/i.  Reports Hip replacement 14 yrs ago and uses 

walker for ambulation. 


Objective: 





20 11:47


                                   Vital Signs











  20





  06:32 09:50


 


Temperature 97.1 F L 98.0 F


 


Pulse Rate 65 66


 


Respiratory 18 16





Rate  


 


Blood Pressure 136/78 124/72


 


O2 Sat by Pulse 96 95





Oximetry (%)  








                                Laboratory Tests











  20





  10:55 10:55 10:55


 


WBC  5.4  


 


RBC  4.82  


 


Hgb  14.8  


 


Hct  45.1  


 


MCV  93.6  


 


MCH  30.7  


 


MCHC  32.8  


 


RDW  16.6 H  


 


Plt Count  254  


 


MPV  7.2 L  


 


Sodium   139 


 


Potassium   4.4 


 


Chloride   106 


 


Carbon Dioxide   28 


 


Anion Gap   5 L 


 


BUN   9.9 


 


Creatinine   0.9 


 


Est GFR (CKD-EPI)AfAm   107.97 


 


Est GFR (CKD-EPI)NonAf   93.16 


 


Random Glucose   143 H 


 


Calcium   8.4 L 


 


Total Bilirubin   0.4 


 


AST   24 


 


ALT   33 


 


Alkaline Phosphatase   144 H 


 


Total Protein   8.0 


 


Albumin   3.9 


 


Syphilis Serology    Non-reactive








other lab results and covid-19 result pending








Assessment: 





20 11:48


withdrawal sx


Plan: 





cont detox


increase po fluids


maintain safety


follow up with psych consult


Repeat CMP; INR in A.M

## 2020-07-02 LAB
ALBUMIN SERPL-MCNC: 3.5 G/DL (ref 3.4–5)
ALP SERPL-CCNC: 128 U/L (ref 45–117)
ALT SERPL-CCNC: 31 U/L (ref 13–61)
ANION GAP SERPL CALC-SCNC: 6 MMOL/L (ref 8–16)
AST SERPL-CCNC: 22 U/L (ref 15–37)
BILIRUB SERPL-MCNC: 0.3 MG/DL (ref 0.2–1)
BUN SERPL-MCNC: 13.4 MG/DL (ref 7–18)
CALCIUM SERPL-MCNC: 9.1 MG/DL (ref 8.5–10.1)
CHLORIDE SERPL-SCNC: 103 MMOL/L (ref 98–107)
CO2 SERPL-SCNC: 31 MMOL/L (ref 21–32)
CREAT SERPL-MCNC: 0.8 MG/DL (ref 0.55–1.3)
GLUCOSE SERPL-MCNC: 81 MG/DL (ref 74–106)
INR BLD: 0.91 (ref 0.83–1.09)
POTASSIUM SERPLBLD-SCNC: 4.3 MMOL/L (ref 3.5–5.1)
PROT SERPL-MCNC: 7.4 G/DL (ref 6.4–8.2)
PT PNL PPP: 10.7 SEC (ref 9.7–13)
SODIUM SERPL-SCNC: 140 MMOL/L (ref 136–145)

## 2020-07-02 RX ADMIN — Medication SCH TAB: at 10:35

## 2020-07-02 RX ADMIN — HYDROXYZINE PAMOATE SCH MG: 25 CAPSULE ORAL at 06:08

## 2020-07-02 RX ADMIN — HYDROXYZINE PAMOATE SCH: 25 CAPSULE ORAL at 13:05

## 2020-07-02 RX ADMIN — HYDROXYZINE PAMOATE SCH: 25 CAPSULE ORAL at 10:35

## 2020-07-02 RX ADMIN — HYDROXYZINE PAMOATE SCH MG: 25 CAPSULE ORAL at 22:11

## 2020-07-02 RX ADMIN — METHOCARBAMOL PRN MG: 500 TABLET ORAL at 22:11

## 2020-07-02 RX ADMIN — Medication SCH MG: at 22:11

## 2020-07-02 RX ADMIN — HYDROXYZINE PAMOATE SCH MG: 25 CAPSULE ORAL at 18:13

## 2020-07-02 NOTE — PN
S CIWA





- CIWA Score


Nausea/Vomitin-No Nausea/No Vomiting


Muscle Tremors: 3


Anxiety: 3


Agitation: 3


Paroxysmal Sweats: 1-Minimal Palms Moist


Orientation: 0-Oriented


Tacttile Disturbances: 0-None


Auditory Disturbances: 0-None


Visual Disturbances: 0-None


Headache: 0-None Present


CIWA-Ar Total Score: 10





BHS Progress Note (SOAP)


Subjective: 





Pt is mor communicative today. Reminiscing his "loneliness leading to drinking" 

and desire to get his life together. 


slight anxiety, sweats.


Nurse Elvira Aguayo reports pt refused librium this morning. Pt was spoken to 

by this writer and  pt says he does not need it at that time and will take when 

he feels anxious and tremors because he has detoxed many times and knows when he

needs it.


Objective: 





20 11:45


                                   Vital Signs











  20





  06:49


 


Temperature 98.4 F


 


Pulse Rate 72


 


Respiratory 18





Rate 


 


Blood Pressure 141/78


 


O2 Sat by Pulse 97





Oximetry (%) 








                                Laboratory Tests











  20





  10:55 10:55 10:55


 


WBC  5.4  


 


RBC  4.82  


 


Hgb  14.8  


 


Hct  45.1  


 


MCV  93.6  


 


MCH  30.7  


 


MCHC  32.8  


 


RDW  16.6 H  


 


Plt Count  254  


 


MPV  7.2 L  


 


Sodium   139 


 


Potassium   4.4 


 


Chloride   106 


 


Carbon Dioxide   28 


 


Anion Gap   5 L 


 


BUN   9.9 


 


Creatinine   0.9 


 


Est GFR (CKD-EPI)AfAm   107.97 


 


Est GFR (CKD-EPI)NonAf   93.16 


 


Random Glucose   143 H 


 


Calcium   8.4 L 


 


Total Bilirubin   0.4 


 


AST   24 


 


ALT   33 


 


Alkaline Phosphatase   144 H 


 


Total Protein   8.0 


 


Albumin   3.9 


 


Urine Color   


 


Urine Appearance   


 


Urine pH   


 


Ur Specific Gravity   


 


Urine Protein   


 


Urine Glucose (UA)   


 


Urine Ketones   


 


Urine Blood   


 


Urine Nitrite   


 


Urine Bilirubin   


 


Urine Urobilinogen   


 


Ur Leukocyte Esterase   


 


Syphilis Serology    Non-reactive


 


COVID-19 (DINA)   














  20





  12:15 16:02


 


WBC  


 


RBC  


 


Hgb  


 


Hct  


 


MCV  


 


MCH  


 


MCHC  


 


RDW  


 


Plt Count  


 


MPV  


 


Sodium  


 


Potassium  


 


Chloride  


 


Carbon Dioxide  


 


Anion Gap  


 


BUN  


 


Creatinine  


 


Est GFR (CKD-EPI)AfAm  


 


Est GFR (CKD-EPI)NonAf  


 


Random Glucose  


 


Calcium  


 


Total Bilirubin  


 


AST  


 


ALT  


 


Alkaline Phosphatase  


 


Total Protein  


 


Albumin  


 


Urine Color   Yellow


 


Urine Appearance   Clear


 


Urine pH   6.5  D


 


Ur Specific Gravity   1.039 H


 


Urine Protein   Negative


 


Urine Glucose (UA)   Negative


 


Urine Ketones   Negative


 


Urine Blood   Negative


 


Urine Nitrite   Negative


 


Urine Bilirubin   Negative


 


Urine Urobilinogen   0.2


 


Ur Leukocyte Esterase   Negative


 


Syphilis Serology  


 


COVID-19 (DINA)  Not detected 








covid-19 not detected


Repeat lab results pending





Alert o x 3


nad


oob with steady gait with walker.





Assessment: 





20 11:46


mild withdrawal sx





Plan: 





cont detox


po fluids as tolerated


maintain safety

## 2020-07-03 RX ADMIN — METHOCARBAMOL PRN MG: 500 TABLET ORAL at 22:08

## 2020-07-03 RX ADMIN — HYDROXYZINE PAMOATE SCH MG: 25 CAPSULE ORAL at 10:55

## 2020-07-03 RX ADMIN — HYDROXYZINE PAMOATE SCH MG: 25 CAPSULE ORAL at 18:18

## 2020-07-03 RX ADMIN — Medication SCH MG: at 22:08

## 2020-07-03 RX ADMIN — HYDROXYZINE PAMOATE SCH: 25 CAPSULE ORAL at 14:55

## 2020-07-03 RX ADMIN — HYDROXYZINE PAMOATE SCH MG: 25 CAPSULE ORAL at 06:07

## 2020-07-03 RX ADMIN — HYDROXYZINE PAMOATE SCH MG: 25 CAPSULE ORAL at 22:08

## 2020-07-03 RX ADMIN — Medication SCH TAB: at 10:55

## 2020-07-03 NOTE — PN
S CIWA





- CIWA Score


Nausea/Vomitin-No Nausea/No Vomiting


Muscle Tremors: 1-None Visible, but Felt


Anxiety: 2


Agitation: 0-Normal Activity


Paroxysmal Sweats: No Perspiration


Orientation: 0-Oriented


Tacttile Disturbances: 0-None


Auditory Disturbances: 0-None


Visual Disturbances: 0-None


Headache: 0-None Present


CIWA-Ar Total Score: 3





[facility] Progress Note (SOAP)


Subjective: 





pt reports feeling better except intermittent sleep. Lying comfortably in bed at

rounds.





Objective: 





20 15:07


                               Vital Signs - 8 hr











  20





  09:14


 


Temperature 98 F


 


Pulse Rate 68


 


Respiratory 18





Rate 


 


Blood Pressure 105/63








                                Laboratory Tests











  20





  10:55 10:55 10:55


 


WBC  5.4  


 


RBC  4.82  


 


Hgb  14.8  


 


Hct  45.1  


 


MCV  93.6  


 


MCH  30.7  


 


MCHC  32.8  


 


RDW  16.6 H  


 


Plt Count  254  


 


MPV  7.2 L  


 


PT with INR   


 


INR   


 


Sodium   139 


 


Potassium   4.4 


 


Chloride   106 


 


Carbon Dioxide   28 


 


Anion Gap   5 L 


 


BUN   9.9 


 


Creatinine   0.9 


 


Est GFR (CKD-EPI)AfAm   107.97 


 


Est GFR (CKD-EPI)NonAf   93.16 


 


Random Glucose   143 H 


 


Calcium   8.4 L 


 


Total Bilirubin   0.4 


 


AST   24 


 


ALT   33 


 


Alkaline Phosphatase   144 H 


 


Total Protein   8.0 


 


Albumin   3.9 


 


Urine Color   


 


Urine Appearance   


 


Urine pH   


 


Ur Specific Gravity   


 


Urine Protein   


 


Urine Glucose (UA)   


 


Urine Ketones   


 


Urine Blood   


 


Urine Nitrite   


 


Urine Bilirubin   


 


Urine Urobilinogen   


 


Ur Leukocyte Esterase   


 


Syphilis Serology    Non-reactive


 


COVID-19 (DINA)   














  20





  12:15 16:02 10:00


 


WBC   


 


RBC   


 


Hgb   


 


Hct   


 


MCV   


 


MCH   


 


MCHC   


 


RDW   


 


Plt Count   


 


MPV   


 


PT with INR   


 


INR   


 


Sodium    140


 


Potassium    4.3


 


Chloride    103


 


Carbon Dioxide    31


 


Anion Gap    6 L


 


BUN    13.4


 


Creatinine    0.8


 


Est GFR (CKD-EPI)AfAm    113.33


 


Est GFR (CKD-EPI)NonAf    97.78


 


Random Glucose    81


 


Calcium    9.1


 


Total Bilirubin    0.3


 


AST    22


 


ALT    31


 


Alkaline Phosphatase    128 H


 


Total Protein    7.4


 


Albumin    3.5


 


Urine Color   Yellow 


 


Urine Appearance   Clear 


 


Urine pH   6.5  D 


 


Ur Specific Gravity   1.039 H 


 


Urine Protein   Negative 


 


Urine Glucose (UA)   Negative 


 


Urine Ketones   Negative 


 


Urine Blood   Negative 


 


Urine Nitrite   Negative 


 


Urine Bilirubin   Negative 


 


Urine Urobilinogen   0.2 


 


Ur Leukocyte Esterase   Negative 


 


Syphilis Serology   


 


COVID-19 (DINA)  Not detected  














  20





  10:00


 


WBC 


 


RBC 


 


Hgb 


 


Hct 


 


MCV 


 


MCH 


 


MCHC 


 


RDW 


 


Plt Count 


 


MPV 


 


PT with INR  10.70


 


INR  0.91


 


Sodium 


 


Potassium 


 


Chloride 


 


Carbon Dioxide 


 


Anion Gap 


 


BUN 


 


Creatinine 


 


Est GFR (CKD-EPI)AfAm 


 


Est GFR (CKD-EPI)NonAf 


 


Random Glucose 


 


Calcium 


 


Total Bilirubin 


 


AST 


 


ALT 


 


Alkaline Phosphatase 


 


Total Protein 


 


Albumin 


 


Urine Color 


 


Urine Appearance 


 


Urine pH 


 


Ur Specific Gravity 


 


Urine Protein 


 


Urine Glucose (UA) 


 


Urine Ketones 


 


Urine Blood 


 


Urine Nitrite 


 


Urine Bilirubin 


 


Urine Urobilinogen 


 


Ur Leukocyte Esterase 


 


Syphilis Serology 


 


COVID-19 (DINA) 











covid-19 not detected


repeat CMP grossly improved





Alert o x 3


nad


oob ambulating with steady gait





Assessment: 





20 15:08


mild withdrawal sx


Plan: 





cont detox


increase po fluids


maintain safety

## 2020-07-04 RX ADMIN — HYDROXYZINE PAMOATE SCH MG: 25 CAPSULE ORAL at 10:16

## 2020-07-04 RX ADMIN — HYDROXYZINE PAMOATE SCH: 25 CAPSULE ORAL at 13:52

## 2020-07-04 RX ADMIN — HYDROXYZINE PAMOATE SCH: 25 CAPSULE ORAL at 17:28

## 2020-07-04 RX ADMIN — HYDROXYZINE PAMOATE SCH: 25 CAPSULE ORAL at 22:23

## 2020-07-04 RX ADMIN — Medication SCH: at 22:23

## 2020-07-04 RX ADMIN — Medication SCH TAB: at 10:16

## 2020-07-04 RX ADMIN — HYDROXYZINE PAMOATE SCH MG: 25 CAPSULE ORAL at 06:39

## 2020-07-04 NOTE — PN
[facility] CIWA





- CIWA Score


Nausea/Vomitin-No Nausea/No Vomiting


Muscle Tremors: None


Anxiety: 2


Agitation: 0-Normal Activity


Paroxysmal Sweats: 1-Minimal Palms Moist


Orientation: 0-Oriented


Tacttile Disturbances: 0-None


Auditory Disturbances: 0-None


Visual Disturbances: 0-None


Headache: 0-None Present


CIWA-Ar Total Score: 3





BHS Progress Note (SOAP)


Subjective: 





c/o mild withdrawal symptoms.


Objective: 





20 09:48


                                   Vital Signs











  20





  06:07 08:36


 


Temperature 97.3 F L 97.5 F L


 


Pulse Rate 67 72


 


Respiratory 16 18





Rate  


 


Blood Pressure 137/79 125/62








                             Laboratory Last Values











WBC  5.4 K/mm3 (4.0-10.0)   20  10:55    


 


RBC  4.82 M/mm3 (4.00-5.60)   20  10:55    


 


Hgb  14.8 GM/dL (11.7-16.9)   20  10:55    


 


Hct  45.1 % (35.4-49)   20  10:55    


 


MCV  93.6 fl (80-96)   20  10:55    


 


MCH  30.7 pg (25.7-33.7)   20  10:55    


 


MCHC  32.8 g/dl (32.0-35.9)   20  10:55    


 


RDW  16.6 % (11.9-15.9)  H  20  10:55    


 


Plt Count  254 K/MM3 (134-434)   20  10:55    


 


MPV  7.2 fl (7.5-11.1)  L  20  10:55    


 


PT with INR  10.70 SEC (9.7-13.0)   20  10:00    


 


INR  0.91  (0.83-1.09)   20  10:00    


 


Sodium  140 mmol/L (136-145)   20  10:00    


 


Potassium  4.3 mmol/L (3.5-5.1)   20  10:00    


 


Chloride  103 mmol/L ()   20  10:00    


 


Carbon Dioxide  31 mmol/L (21-32)   20  10:00    


 


Anion Gap  6 MMOL/L (8-16)  L  20  10:00    


 


BUN  13.4 mg/dL (7-18)   20  10:00    


 


Creatinine  0.8 mg/dL (0.55-1.3)   20  10:00    


 


Est GFR (CKD-EPI)AfAm  113.33   20  10:00    


 


Est GFR (CKD-EPI)NonAf  97.78   20  10:00    


 


Random Glucose  81 mg/dL ()   20  10:00    


 


Calcium  9.1 mg/dL (8.5-10.1)   20  10:00    


 


Total Bilirubin  0.3 mg/dL (0.2-1)   20  10:00    


 


AST  22 U/L (15-37)   20  10:00    


 


ALT  31 U/L (13-61)   20  10:00    


 


Alkaline Phosphatase  128 U/L ()  H  20  10:00    


 


Total Protein  7.4 g/dl (6.4-8.2)   20  10:00    


 


Albumin  3.5 g/dl (3.4-5.0)   20  10:00    


 


Urine Color  Yellow   20  16:02    


 


Urine Appearance  Clear   20  16:02    


 


Urine pH  6.5  (5.0-8.0)  D 20  16:02    


 


Ur Specific Gravity  1.039  (1.010-1.035)  H  20  16:02    


 


Urine Protein  Negative  (NEGATIVE)   20  16:02    


 


Urine Glucose (UA)  Negative  (NEGATIVE)   20  16:02    


 


Urine Ketones  Negative  (NEGATIVE)   20  16:02    


 


Urine Blood  Negative  (NEGATIVE)   20  16:02    


 


Urine Nitrite  Negative  (NEGATIVE)   20  16:02    


 


Urine Bilirubin  Negative  (NEGATIVE)   20  16:02    


 


Urine Urobilinogen  0.2 mg/dL (0.2-1.0)   20  16:02    


 


Ur Leukocyte Esterase  Negative  (NEGATIVE)   20  16:02    


 


Syphilis Serology  Non-reactive  (NONREACTIVE)   20  10:55    


 


COVID-19 (DINA)  Not detected  (Not Detected)   20  12:15    








Labs noted.


Assessment: 





20 09:48


AOX3, in no acute respiratory distress.


Full ROM, ambulating in the unit.


Mild Withdrawal symptoms.


For d/c tomorrow.


Plan: 





continue detox.


D/C in AM.

## 2020-07-05 ENCOUNTER — HOSPITAL ENCOUNTER (INPATIENT)
Dept: HOSPITAL 74 - YASAS | Age: 60
LOS: 8 days | Discharge: HOME | DRG: 895 | End: 2020-07-13
Attending: ALLERGY & IMMUNOLOGY | Admitting: ALLERGY & IMMUNOLOGY
Payer: COMMERCIAL

## 2020-07-05 VITALS — SYSTOLIC BLOOD PRESSURE: 128 MMHG | HEART RATE: 84 BPM | DIASTOLIC BLOOD PRESSURE: 76 MMHG | TEMPERATURE: 97.7 F

## 2020-07-05 DIAGNOSIS — F10.20: Primary | ICD-10-CM

## 2020-07-05 DIAGNOSIS — F17.211: ICD-10-CM

## 2020-07-05 DIAGNOSIS — R01.1: ICD-10-CM

## 2020-07-05 DIAGNOSIS — Z96.643: ICD-10-CM

## 2020-07-05 DIAGNOSIS — K21.9: ICD-10-CM

## 2020-07-05 DIAGNOSIS — Z99.89: ICD-10-CM

## 2020-07-05 DIAGNOSIS — Z56.0: ICD-10-CM

## 2020-07-05 DIAGNOSIS — Z59.0: ICD-10-CM

## 2020-07-05 DIAGNOSIS — Z95.1: ICD-10-CM

## 2020-07-05 DIAGNOSIS — Z86.19: ICD-10-CM

## 2020-07-05 DIAGNOSIS — F10.24: ICD-10-CM

## 2020-07-05 DIAGNOSIS — Z98.890: ICD-10-CM

## 2020-07-05 DIAGNOSIS — F32.9: ICD-10-CM

## 2020-07-05 DIAGNOSIS — F10.282: ICD-10-CM

## 2020-07-05 DIAGNOSIS — F20.0: ICD-10-CM

## 2020-07-05 PROCEDURE — HZ42ZZZ GROUP COUNSELING FOR SUBSTANCE ABUSE TREATMENT, COGNITIVE-BEHAVIORAL: ICD-10-PCS | Performed by: ALLERGY & IMMUNOLOGY

## 2020-07-05 RX ADMIN — Medication SCH MG: at 22:00

## 2020-07-05 RX ADMIN — HYDROXYZINE PAMOATE SCH MG: 25 CAPSULE ORAL at 06:43

## 2020-07-05 RX ADMIN — HYDROXYZINE PAMOATE SCH MG: 25 CAPSULE ORAL at 10:09

## 2020-07-05 RX ADMIN — Medication SCH TAB: at 10:10

## 2020-07-05 RX ADMIN — QUETIAPINE FUMARATE SCH MG: 100 TABLET ORAL at 22:00

## 2020-07-05 SDOH — ECONOMIC STABILITY - HOUSING INSECURITY: HOMELESSNESS: Z59.0

## 2020-07-05 SDOH — ECONOMIC STABILITY - INCOME SECURITY: UNEMPLOYMENT, UNSPECIFIED: Z56.0

## 2020-07-05 NOTE — DS
[facility] Detox Discharge Summary


Admission Date: 


20





Discharge Date: 20





- History


Present History: Alcohol Dependence


Additional Comments: 





59 years old male admitted on 20 for alcohol withdrawal sx management 

treated with librium detox regiment 


seen by psychiatrist nasra hussein


mr patiño has completed the librium regiment and is tolerated well





alert oriented x 3 speech clearly coherently 


respiratory clear lung sounds bilaterally on auscultation


abdomen soft no rebound tenderness


skin warm and dry 





Pertinent Past History: 





time for discharge 35 minutes 





- Physical Exam Results


Vital Signs: 


                                   Vital Signs











Temperature  97.7 F   20 08:37


 


Pulse Rate  84   20 08:37


 


Respiratory Rate  18   20 08:37


 


Blood Pressure  128/76   20 08:37


 


O2 Sat by Pulse Oximetry (%)  97   20 20:30











Pertinent Admission Physical Exam Findings: 





alcohol withdrawal 


                                Laboratory Tests











  20





  10:55 10:55 10:55


 


WBC  5.4  


 


RBC  4.82  


 


Hgb  14.8  


 


Hct  45.1  


 


MCV  93.6  


 


MCH  30.7  


 


MCHC  32.8  


 


RDW  16.6 H  


 


Plt Count  254  


 


MPV  7.2 L  


 


PT with INR   


 


INR   


 


Sodium   139 


 


Potassium   4.4 


 


Chloride   106 


 


Carbon Dioxide   28 


 


Anion Gap   5 L 


 


BUN   9.9 


 


Creatinine   0.9 


 


Est GFR (CKD-EPI)AfAm   107.97 


 


Est GFR (CKD-EPI)NonAf   93.16 


 


Random Glucose   143 H 


 


Calcium   8.4 L 


 


Total Bilirubin   0.4 


 


AST   24 


 


ALT   33 


 


Alkaline Phosphatase   144 H 


 


Total Protein   8.0 


 


Albumin   3.9 


 


Urine Color   


 


Urine Appearance   


 


Urine pH   


 


Ur Specific Gravity   


 


Urine Protein   


 


Urine Glucose (UA)   


 


Urine Ketones   


 


Urine Blood   


 


Urine Nitrite   


 


Urine Bilirubin   


 


Urine Urobilinogen   


 


Ur Leukocyte Esterase   


 


Syphilis Serology    Non-reactive


 


COVID-19 (DINA)   














  20





  12:15 16:02 10:00


 


WBC   


 


RBC   


 


Hgb   


 


Hct   


 


MCV   


 


MCH   


 


MCHC   


 


RDW   


 


Plt Count   


 


MPV   


 


PT with INR   


 


INR   


 


Sodium    140


 


Potassium    4.3


 


Chloride    103


 


Carbon Dioxide    31


 


Anion Gap    6 L


 


BUN    13.4


 


Creatinine    0.8


 


Est GFR (CKD-EPI)AfAm    113.33


 


Est GFR (CKD-EPI)NonAf    97.78


 


Random Glucose    81


 


Calcium    9.1


 


Total Bilirubin    0.3


 


AST    22


 


ALT    31


 


Alkaline Phosphatase    128 H


 


Total Protein    7.4


 


Albumin    3.5


 


Urine Color   Yellow 


 


Urine Appearance   Clear 


 


Urine pH   6.5  D 


 


Ur Specific Gravity   1.039 H 


 


Urine Protein   Negative 


 


Urine Glucose (UA)   Negative 


 


Urine Ketones   Negative 


 


Urine Blood   Negative 


 


Urine Nitrite   Negative 


 


Urine Bilirubin   Negative 


 


Urine Urobilinogen   0.2 


 


Ur Leukocyte Esterase   Negative 


 


Syphilis Serology   


 


COVID-19 (DINA)  Not detected  














  20





  10:00


 


WBC 


 


RBC 


 


Hgb 


 


Hct 


 


MCV 


 


MCH 


 


MCHC 


 


RDW 


 


Plt Count 


 


MPV 


 


PT with INR  10.70


 


INR  0.91


 


Sodium 


 


Potassium 


 


Chloride 


 


Carbon Dioxide 


 


Anion Gap 


 


BUN 


 


Creatinine 


 


Est GFR (CKD-EPI)AfAm 


 


Est GFR (CKD-EPI)NonAf 


 


Random Glucose 


 


Calcium 


 


Total Bilirubin 


 


AST 


 


ALT 


 


Alkaline Phosphatase 


 


Total Protein 


 


Albumin 


 


Urine Color 


 


Urine Appearance 


 


Urine pH 


 


Ur Specific Gravity 


 


Urine Protein 


 


Urine Glucose (UA) 


 


Urine Ketones 


 


Urine Blood 


 


Urine Nitrite 


 


Urine Bilirubin 


 


Urine Urobilinogen 


 


Ur Leukocyte Esterase 


 


Syphilis Serology 


 


COVID-19 (DINA) 








lab noted





- Treatment


Hospital Course: Detox Protocol Followed, Detoxed Safely, Responded well, D

ischarged Condition Good, Rehab Referral Accepted


Patient has Accepted [facility] Referral to: [facility] 





- Medication


Discharge Medications: 


Ambulatory Orders





NK [No Known Home Medication]  18 











- Diagnosis


(1) Alcohol dependence with uncomplicated withdrawal


Status: Acute   





(2) Asthma


Status: Chronic   


Qualifiers: 


   Asthma severity: mild   Asthma persistence: intermittent   Asthma 

complication type: with status asthmaticus   Qualified Code(s): J45.22 - Mild 

intermittent asthma with status asthmaticus   





(3) Walker as ambulation aid


Status: Chronic   





(4) Substance induced mood disorder


Status: Suspected   





- AMA


Did Patient Leave Against Medical Advice: No





CIWA Score





- CIWA Score


Nausea/Vomitin-No Nausea/No Vomiting


Muscle Tremors: None


Anxiety: 1-Mildly Anxious


Agitation: 0-Normal Activity


Paroxysmal Sweats: No Perspiration


Orientation: 0-Oriented


Tacttile Disturbances: 0-None


Auditory Disturbances: 0-None


Visual Disturbances: 0-None


Headache: 0-None Present


CIWA-Ar Total Score: 1

## 2020-07-05 NOTE — HP
[facility] MD Rehab Assess/Revision





- Admission History


Ad[facility] to [facility]hab from: [facility]


Date of Admission to Rehab: [facility]





- Vital signs


Vital Signs: 


                                   Vital Signs











 Period  Temp  Pulse  Resp  BP Sys/Cisneros  Pulse Ox


 


 Last 24 Hr  98.9 F  79  18  143/76  














- Findings


Detox History & Physical reviewed: Yes


Concur with findings: Yes


Comments/Additional Findings: trasnferred from detox to rehab admission as per 

protocol





Inpatient Rehab Admission





- Rehab Decision to Admit


Inpatient rehab admission?: Yes





- Initial Determination


Are CD services needed?: Yes


Free of communicable disease: Yes


Not in need of hospitalization: Yes





- Rehab Admission Criteria


Previous failed treatment: Yes


Poor recovery environment: Yes


Comorbidities: Yes


Lacks judgement: Yes


Patient is meeting Inpatient Rehab admission criteria:: Yes
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
## 2020-07-06 RX ADMIN — QUETIAPINE FUMARATE SCH MG: 100 TABLET ORAL at 21:22

## 2020-07-06 RX ADMIN — Medication SCH MG: at 21:22

## 2020-07-06 RX ADMIN — Medication SCH TAB: at 09:56

## 2020-07-06 RX ADMIN — NICOTINE SCH: 7 PATCH TRANSDERMAL at 09:56

## 2020-07-07 RX ADMIN — QUETIAPINE FUMARATE SCH MG: 100 TABLET ORAL at 21:06

## 2020-07-07 RX ADMIN — NICOTINE SCH: 7 PATCH TRANSDERMAL at 10:38

## 2020-07-07 RX ADMIN — Medication SCH TAB: at 10:39

## 2020-07-07 RX ADMIN — Medication SCH MG: at 21:06

## 2020-07-08 RX ADMIN — QUETIAPINE FUMARATE SCH MG: 100 TABLET ORAL at 21:19

## 2020-07-08 RX ADMIN — Medication SCH TAB: at 09:29

## 2020-07-08 RX ADMIN — Medication SCH MG: at 21:19

## 2020-07-08 RX ADMIN — NICOTINE SCH: 7 PATCH TRANSDERMAL at 09:29

## 2020-07-08 RX ADMIN — ALUMINUM HYDROXIDE, MAGNESIUM HYDROXIDE, AND SIMETHICONE PRN ML: 200; 200; 20 SUSPENSION ORAL at 09:36

## 2020-07-08 RX ADMIN — ALUMINUM HYDROXIDE, MAGNESIUM HYDROXIDE, AND SIMETHICONE PRN ML: 200; 200; 20 SUSPENSION ORAL at 21:20

## 2020-07-09 RX ADMIN — NICOTINE SCH: 7 PATCH TRANSDERMAL at 10:12

## 2020-07-09 RX ADMIN — Medication SCH MG: at 21:34

## 2020-07-09 RX ADMIN — QUETIAPINE FUMARATE SCH MG: 100 TABLET ORAL at 21:34

## 2020-07-09 RX ADMIN — Medication SCH TAB: at 10:12

## 2020-07-09 RX ADMIN — ALUMINUM HYDROXIDE, MAGNESIUM HYDROXIDE, AND SIMETHICONE PRN ML: 200; 200; 20 SUSPENSION ORAL at 10:43

## 2020-07-09 NOTE — PN
B[facility] Progress Note


Note: 


Patient c/o indigestion, taking maalox with every meal. States tomato based 

products upset his stomach


Physical:


General: no apparent distress


HEENTM: normocephalic, throat clear, voice normal


ABD: + BS, protuberant





Assessment: GERD





Plan: started Protonix,

## 2020-07-09 NOTE — PN
BHS Progress Note


Note: 


Patient admitted to [facility]. 


                                   Vital Signs











 Period  Temp  Pulse  Resp  BP Sys/Cisneros  Pulse Ox


 


 Last 24 Hr  97.5 F-97.7 F  76  18-20  122/74  95-98








                                        





                                        








general: no apparent distress


Neuro: A + O X 4


MSK: full weight bearing, steady gait





Substance use disorder





Hydration


Nutrition


maintain safety


Continue with Rehab


Methadone @0600 daily


Labs, problem list, notes and documents reviewed.

## 2020-07-10 RX ADMIN — Medication SCH MG: at 21:19

## 2020-07-10 RX ADMIN — QUETIAPINE FUMARATE SCH MG: 100 TABLET ORAL at 21:19

## 2020-07-10 RX ADMIN — Medication SCH TAB: at 09:48

## 2020-07-10 RX ADMIN — NICOTINE SCH: 7 PATCH TRANSDERMAL at 09:48

## 2020-07-10 RX ADMIN — PANTOPRAZOLE SODIUM SCH MG: 40 TABLET, DELAYED RELEASE ORAL at 09:49

## 2020-07-11 RX ADMIN — Medication SCH MG: at 21:15

## 2020-07-11 RX ADMIN — QUETIAPINE FUMARATE SCH MG: 100 TABLET ORAL at 21:15

## 2020-07-11 RX ADMIN — NICOTINE SCH: 7 PATCH TRANSDERMAL at 10:08

## 2020-07-11 RX ADMIN — Medication SCH TAB: at 10:08

## 2020-07-11 RX ADMIN — PANTOPRAZOLE SODIUM SCH MG: 40 TABLET, DELAYED RELEASE ORAL at 10:08

## 2020-07-12 RX ADMIN — Medication SCH MG: at 21:24

## 2020-07-12 RX ADMIN — NICOTINE SCH: 7 PATCH TRANSDERMAL at 10:24

## 2020-07-12 RX ADMIN — QUETIAPINE FUMARATE SCH MG: 100 TABLET ORAL at 21:24

## 2020-07-12 RX ADMIN — PANTOPRAZOLE SODIUM SCH MG: 40 TABLET, DELAYED RELEASE ORAL at 10:23

## 2020-07-12 RX ADMIN — Medication SCH TAB: at 10:23

## 2020-07-12 NOTE — PN
BHS Progress Note


Note: 





Patient is scheduled for discharge tomorrow. Script for 30 days supply of 

Seroquel 100 mg/hs will be electronically transmitted to [facility], [address] [place] [address] [address]

## 2020-07-13 VITALS — DIASTOLIC BLOOD PRESSURE: 91 MMHG | SYSTOLIC BLOOD PRESSURE: 147 MMHG | TEMPERATURE: 97.7 F | HEART RATE: 81 BPM

## 2020-07-13 RX ADMIN — Medication SCH TAB: at 09:37

## 2020-07-13 RX ADMIN — NICOTINE SCH: 7 PATCH TRANSDERMAL at 09:37

## 2020-07-13 RX ADMIN — PANTOPRAZOLE SODIUM SCH MG: 40 TABLET, DELAYED RELEASE ORAL at 09:37

## 2020-07-13 NOTE — DS
[facility] Discharge Summary





- [facility] Discharge Summary


Admission Date: 07/05/20


Discharge Date: 07/13/20





- History


Present History: Alcohol dependence


Pertinent Past History: 





Hx Asthma


Hx CABG


Hx Heart MurmurParanoid Schizophrenia





- Discharge Physical Exam


Vital Signs: 


                                   Vital Signs











Temperature  97.7 F   07/13/20 07:16


 


Pulse Rate  81   07/13/20 07:16


 


Respiratory Rate  18   07/13/20 07:16


 


Blood Pressure  147/91   07/13/20 07:16


 


O2 Sat by Pulse Oximetry (%)  99   07/13/20 07:16








Alert ox 3


nad


oob ambulating with steady gait


cardiac;s1 s2,rrr


lungs:ctab


abdomen:soft,+bs,nt,++fatty


extremities:no edema,right lower ext. with brown discoloration.


Pertinent Admission Physical Exam Findings: 





                                Laboratory Tests











  07/06/20





  07:00


 


Sickle Cell Screen  Negative














- Treatment


Discharge Condition: Discharge condition good


Hospital Course: 





Pt completed rehab and discharged today. Pt has been referred to follow up with 

[facility] aftercare at [facility] program.


Rehabilitated safely


Responded well





- Medication


Discharge Medications: 


Ambulatory Orders





Quetiapine Fumarate [Seroquel -] 100 mg PO HS 07/05/20 


Quetiapine Fumarate [Seroquel -] 100 mg PO HS #30 tablet 07/12/20 











- Medication-Assisted Treatment (MAT)


Medication-Assisted Treatment (MAT): No





- Discharge Instructions


Diet, activity, other medical instructions: 





Diet:Regular





Activity:oob ad bethel





Other medical instructions:follow up with [facility] aftercare recommendations as 

scheduled.








- Diagnosis


(1) Alcohol dependence


Status: Chronic   


Qualifiers: 


   Substance use status: uncomplicated   Qualified Code(s): F10.20 - Alcohol 

dependence, uncomplicated   





(2) H/O bilateral hip replacements


Status: Chronic   





(3) Walker as ambulation aid


Status: Chronic   





- Follow-up Referral


Minutes to complete discharge: 25





- AMA


Did Patient Leave Against Medical Advice: No


Additional Comments: 





Pt reports he has a PCP Dr. Ricardo Hughes  with [facility] at [address] [place] [address]. Pt will follow up with medical management with PCP after 

discharge.

## 2020-08-22 ENCOUNTER — HOSPITAL ENCOUNTER (INPATIENT)
Dept: HOSPITAL 74 - YASAS | Age: 60
LOS: 4 days | Discharge: OTHER INSTITUTION | DRG: 897 | End: 2020-08-26
Attending: ALLERGY & IMMUNOLOGY | Admitting: ALLERGY & IMMUNOLOGY
Payer: COMMERCIAL

## 2020-08-22 DIAGNOSIS — F19.24: ICD-10-CM

## 2020-08-22 DIAGNOSIS — F32.9: ICD-10-CM

## 2020-08-22 DIAGNOSIS — Z86.19: ICD-10-CM

## 2020-08-22 DIAGNOSIS — Z59.0: ICD-10-CM

## 2020-08-22 DIAGNOSIS — F10.230: Primary | ICD-10-CM

## 2020-08-22 DIAGNOSIS — Z62.810: ICD-10-CM

## 2020-08-22 DIAGNOSIS — R22.42: ICD-10-CM

## 2020-08-22 DIAGNOSIS — Z96.643: ICD-10-CM

## 2020-08-22 DIAGNOSIS — F17.211: ICD-10-CM

## 2020-08-22 DIAGNOSIS — Z98.890: ICD-10-CM

## 2020-08-22 DIAGNOSIS — Z56.0: ICD-10-CM

## 2020-08-22 DIAGNOSIS — L53.8: ICD-10-CM

## 2020-08-22 DIAGNOSIS — Z88.8: ICD-10-CM

## 2020-08-22 DIAGNOSIS — R01.1: ICD-10-CM

## 2020-08-22 DIAGNOSIS — Z99.89: ICD-10-CM

## 2020-08-22 DIAGNOSIS — J45.909: ICD-10-CM

## 2020-08-22 DIAGNOSIS — Z20.2: ICD-10-CM

## 2020-08-22 PROCEDURE — U0003 INFECTIOUS AGENT DETECTION BY NUCLEIC ACID (DNA OR RNA); SEVERE ACUTE RESPIRATORY SYNDROME CORONAVIRUS 2 (SARS-COV-2) (CORONAVIRUS DISEASE [COVID-19]), AMPLIFIED PROBE TECHNIQUE, MAKING USE OF HIGH THROUGHPUT TECHNOLOGIES AS DESCRIBED BY CMS-2020-01-R: HCPCS

## 2020-08-22 PROCEDURE — HZ2ZZZZ DETOXIFICATION SERVICES FOR SUBSTANCE ABUSE TREATMENT: ICD-10-PCS | Performed by: ALLERGY & IMMUNOLOGY

## 2020-08-22 RX ADMIN — HYDROXYZINE PAMOATE SCH MG: 25 CAPSULE ORAL at 18:23

## 2020-08-22 RX ADMIN — Medication SCH MG: at 23:02

## 2020-08-22 RX ADMIN — Medication SCH MG: at 23:03

## 2020-08-22 RX ADMIN — HYDROXYZINE PAMOATE SCH MG: 25 CAPSULE ORAL at 13:01

## 2020-08-22 RX ADMIN — HYDROXYZINE PAMOATE SCH MG: 25 CAPSULE ORAL at 23:02

## 2020-08-22 SDOH — ECONOMIC STABILITY - INCOME SECURITY: UNEMPLOYMENT, UNSPECIFIED: Z56.0

## 2020-08-22 SDOH — ECONOMIC STABILITY - HOUSING INSECURITY: HOMELESSNESS: Z59.0

## 2020-08-22 NOTE — BHS.RME
Substance Use & Tx History





- Substance Use History


  ** Alcohol


Substance amount: 2 pints


Frequency of use: Daily


Substance route: Oral


Date of Last Use: 08/22/20





- Last Treatment


Date of last treatment: 6/30/2020


Treatment type: Substance Use Disorder (TE[place])


Where was last treatment: Detox





Physical/Psych/Mental Status





- Behavior


General Behavior: Increased activity (restlessness, agitation)


Eye Contact: Normal


Other Behaviors: Mannerisms





- Cooperativeness


Cooperativeness: Cooperative





- Thinking


Thought Processes: Tight, Logical, Goal Directed


Thought content: Future oriented





- Physical Health Problems


Is patient presently having any pain?: No


Does patient presently have any injuries (include location): No


Does patient currently have a fever: No

## 2020-08-22 NOTE — HP
CIWA Score


Nausea/Vomitin-Mild Nausea/No Vomiting


Muscle Tremors: None


Anxiety: 3


Agitation: 3


Paroxysmal Sweats: 1-Minimal Palms Moist


Orientation: 0-Oriented


Tacttile Disturbances: 1-Very Mild Itch/Numbness


Auditory Disturbances: 0-None


Visual Disturbances: 0-None


Headache: 0-None Present


CIWA-Ar Total Score: 9





- Admission Criteria


[facility] Guidelines: Admission for Medically Managed Detox: 


Requires at least one of the followin. CIWA greater than 12


2. Seizures within the past 24 hours


3. Delirium tremens within the past 24 hours


4. Hallucinations within the past 24 hours


5. Acute intervention needed for co  occurring medical disorder


6. Acute intervention needed for co  occurring psychiatric disorder


7. Severe withdrawal that cannot be handled at a lower level of care (continued


    vomiting, continued diarrhea, abnormal vital signs) requiring intravenous


    medication and/or fluids


8. Pregnancy





Patient presents the following: Seizures, delirium tremens or hallucinations in 

the past 12 hours


Admission Criteria Met: Admission criteria met





Admitting History and Physical





- Smoking History


Smoking history: Former smoker


Have you smoked in the past 12 months: No


Aproximately how many cigarettes per day: 5


If you are a former smoker, when did you quit?: 





- Alcohol/Substance Use


Hx Alcohol Use: Yes





Admission ROS [facility]





- HPI


Chief Complaint: 





I need detox, I drink every day


Allergies/Adverse Reactions: 


                                    Allergies











Allergy/AdvReac Type Severity Reaction Status Date / Time


 


naproxen [From Naprosyn] Allergy Severe Rash Verified 20 12:03











History of Present Illness: 





Patient is a 59 year old male who was brought in from [facility] where he

was observed in the ED last for blackout from intoxication. Patient reports he 

was found on the street heavily intoxicated, he has no recollection of how he 

ended up in the ED. His last admission to detox was under similar circumstances 

where he was brought in from [facility] for severe intoxication.  He has 

moderate swelling to the LLE, vascular follow up recommended to him in the ER.


Exam Limitations: No Limitations





- Ebola screening


Have you traveled outside of the country in the last 21 days: No


Have you had contact with anyone from an Ebola affected area: No


Have you been sick,other than usual withdrawal symptoms: No


Do you have a fever: No





- Review of Systems


Constitutional: Loss of Appetite, Changes in sleep


EENT: reports: Blurred Vision


Respiratory: reports: No Symptoms reported


Cardiac: reports: No Symptoms Reported


GI: reports: Poor Appetite, Poor Fluid Intake, Abdominal cramping


: reports: No Symptoms Reported


Musculoskeletal: reports: Back Pain, Joint Pain, Muscle Pain


Integumentary: reports: Dryness


Neuro: reports: Numbness, Weakness, Unsteady Gait


Endocrine: reports: No Symptoms Reported


Hematology: reports: No Symptoms Reported


Psychiatric: reports: Depressed


Other Systems: Reviewed and Negative





Patient History





- Patient Medical History


Hx Anemia: No


Hx Asthma: No


Hx Chronic Obstructive Pulmonary Disease (COPD): No


Hx Cancer: No


Hx Cardiac Disorders: Yes (heart murmur, had sx in )


Hx Congestive Heart Failure: No


Hx Hypertension: No


Hx Hypercholesterolemia: No


Hx Pacemaker: No


HX Cerebrovascular Accident: No


Hx Seizures: No


Hx Dementia: No


Hx Diabetes: No


Hx Gastrointestinal Disorders: No


Hx Liver Disease: No


Hx Genitourinary Disorders: No


Hx Sexually Transmitted Disorders: No


Hx Renal Disease (ESRD): No


Hx Thyroid Disease: No


Hx Human Immunodeficiency Virus (HIV): No


Hx Hepatitis C: No


Hx Depression: Yes


Hx Suicide Attempt: No


Hx Bipolar Disorder: No


Hx Schizophrenia: No





- Patient Surgical History


Past Surgical History: Yes


Hx Neurologic Surgery: No


Hx Cataract Extraction: No


Hx Cardiac Surgery: Yes (open heart)


Hx Lung Surgery: No


Hx Breast Surgery: No


Hx Breast Biopsy: No


Hx Abdominal Surgery: Yes (hernia repair)


Hx Appendectomy: No


Hx Cholecystectomy: No


Hx Genitourinary Surgery: No


Hx  Section: No


Hx Orthopedic Surgery: Yes (Lt. hip prosthesis, metal plate rt. thigh)


Other Surgical History: sinus,vocal chord, jesse hip replacement  r/t 

avascular necrosis


Anesthesia Reaction: No





- PPD History


Previous Implant?: Yes


Documented Results: Negative w/proof


Implanted On Prior [facility] Admission?: Yes


Date: 20


Results: 0mm


PPD to be Administered?: No





- Smoking Cessation


Smoking history: Former smoker


Have you smoked in the past 12 months: No


Aproximately how many cigarettes per day: 5


If you are a former smoker, when did you quit?: 


Cigars Per Day: 0


Hx Chewing Tobacco Use: No


Initiated information on smoking cessation: No





Admission Physical Exam [facility]





- Physical


General Appearance: Yes: No Apparent Distress


HEENTM: Yes: Hearing grossly Normal, Normocephalic, Normal Voice


Respiratory: Yes: Chest Non-Tender, Lungs Clear, Normal Breath Sounds, No 

Respiratory Distress, No Accessory Muscle Use


Neck: Yes: No masses,lesions,Nodules, Supple


Breast: Yes: Breast Exam Deferred


Cardiology: Yes: Regular Rhythm, S1, S2


Abdominal: Yes: Normal Bowel Sounds, Soft, Increased Bowel Sounds, Protuberent, 

Distended


Genitourinary: Yes: Within Normal Limits


Back: Yes: Normal Inspection


Musculoskeletal: Yes: Joint Stiffness, Muscle weakness


Extremities: Yes: Pedal Edema, Swelling (to LLE, cool to touch), Erythema (mild 

in the LLE), Other (bilateral hip replacement)


Neurological: Yes: Fully Oriented, Alert, Normal Mood/Affect, Normal Response


Integumentary: Yes: Clammy, Other (stasis changes to RLE)


Lymphatic: Yes: Within Normal Limits





- Diagnostic


(1) Alcohol dependence with uncomplicated withdrawal


Current Visit: Yes   Status: Acute   





(2) Walker as ambulation aid


Current Visit: Yes   Status: Chronic   





Cleared for Admission S





- Detox or Rehab


[facility] Level of Care: Medically Managed


Detox Regimen/Protocol: Librium


Claeared for Rehab Admission: No





Breathalyzer





- Breathalyzer


Breathalyzer: 0.153





Urine Drug Screen





- Test Device


Lot number: [id number]


Expiration date: 22





- Control


Is test valid?: Yes





- Results


Drug screen NEGATIVE: Yes


Urine drug screen results: BZO-Benzodiazepines





Inpatient Rehab Admission





- Rehab Decision to Admit


Inpatient rehab admission?: No

## 2020-08-23 LAB
ALBUMIN SERPL-MCNC: 3.5 G/DL (ref 3.4–5)
ALP SERPL-CCNC: 121 U/L (ref 45–117)
ALT SERPL-CCNC: 31 U/L (ref 13–61)
ANION GAP SERPL CALC-SCNC: 8 MMOL/L (ref 8–16)
AST SERPL-CCNC: 30 U/L (ref 15–37)
BILIRUB SERPL-MCNC: 0.5 MG/DL (ref 0.2–1)
BUN SERPL-MCNC: 12.4 MG/DL (ref 7–18)
CALCIUM SERPL-MCNC: 9 MG/DL (ref 8.5–10.1)
CHLORIDE SERPL-SCNC: 101 MMOL/L (ref 98–107)
CO2 SERPL-SCNC: 30 MMOL/L (ref 21–32)
CREAT SERPL-MCNC: 0.9 MG/DL (ref 0.55–1.3)
DEPRECATED RDW RBC AUTO: 15.1 % (ref 11.9–15.9)
GLUCOSE SERPL-MCNC: 97 MG/DL (ref 74–106)
HCT VFR BLD CALC: 45.3 % (ref 35.4–49)
HGB BLD-MCNC: 15 GM/DL (ref 11.7–16.9)
MCH RBC QN AUTO: 30.8 PG (ref 25.7–33.7)
MCHC RBC AUTO-ENTMCNC: 33 G/DL (ref 32–35.9)
MCV RBC: 93.2 FL (ref 80–96)
PLATELET # BLD AUTO: 209 K/MM3 (ref 134–434)
PMV BLD: 7.1 FL (ref 7.5–11.1)
POTASSIUM SERPLBLD-SCNC: 4 MMOL/L (ref 3.5–5.1)
PROT SERPL-MCNC: 7.8 G/DL (ref 6.4–8.2)
RBC # BLD AUTO: 4.87 M/MM3 (ref 4–5.6)
SODIUM SERPL-SCNC: 139 MMOL/L (ref 136–145)
WBC # BLD AUTO: 4.7 K/MM3 (ref 4–10)

## 2020-08-23 RX ADMIN — HYDROXYZINE PAMOATE SCH: 25 CAPSULE ORAL at 22:41

## 2020-08-23 RX ADMIN — HYDROXYZINE PAMOATE SCH: 25 CAPSULE ORAL at 11:06

## 2020-08-23 RX ADMIN — Medication SCH: at 22:41

## 2020-08-23 RX ADMIN — HYDROXYZINE PAMOATE SCH: 25 CAPSULE ORAL at 14:04

## 2020-08-23 RX ADMIN — HYDROXYZINE PAMOATE SCH MG: 25 CAPSULE ORAL at 17:54

## 2020-08-23 RX ADMIN — AMLODIPINE BESYLATE SCH MG: 10 TABLET ORAL at 15:32

## 2020-08-23 RX ADMIN — Medication SCH: at 11:06

## 2020-08-23 RX ADMIN — HYDROXYZINE PAMOATE SCH: 25 CAPSULE ORAL at 17:49

## 2020-08-23 RX ADMIN — HYDROXYZINE PAMOATE SCH: 25 CAPSULE ORAL at 07:27

## 2020-08-23 NOTE — PN
S CIWA





- CIWA Score


Nausea/Vomitin-Mild Nausea/No Vomiting


Muscle Tremors: 3


Anxiety: 2


Agitation: 0-Normal Activity


Paroxysmal Sweats: 1-Minimal Palms Moist


Orientation: 0-Oriented


Tacttile Disturbances: 0-None


Auditory Disturbances: 0-None


Visual Disturbances: 1-Very Mild Sensitivity


Headache: 0-None Present


CIWA-Ar Total Score: 8





BHS Progress Note (SOAP)


Subjective: 





59 years old male was admitted on 20 for alcohol withdrawal sx management


treating with librium detox regiment 


ate breakfast and lunch in room resting in bed feels tired


encourage right lower leg elevation while in bed 


limited conversation with staff


Objective: 





20 15:29


                               Vital Signs - 24 hr











  20





  17:02 20:45 05:26


 


Temperature 97.5 F L 97.5 F L 97.1 F L


 


Pulse Rate 90 86 70


 


Respiratory 16 16 16





Rate   


 


Blood Pressure 161/89 150/85 124/71


 


O2 Sat by Pulse  98 96





Oximetry (%)   














  20





  08:59 13:00


 


Temperature 97.1 F L 97.1 F L


 


Pulse Rate 94 H 82


 


Respiratory 20 18





Rate  


 


Blood Pressure 160/74 144/76


 


O2 Sat by Pulse  100





Oximetry (%)  








                                Laboratory Tests











  20





  11:45 07:15 07:15


 


WBC    4.7


 


RBC    4.87


 


Hgb    15.0


 


Hct    45.3


 


MCV    93.2


 


MCH    30.8


 


MCHC    33.0


 


RDW    15.1


 


Plt Count    209


 


MPV    7.1 L


 


Sodium   


 


Potassium   


 


Chloride   


 


Carbon Dioxide   


 


Anion Gap   


 


BUN   


 


Creatinine   


 


Est GFR (CKD-EPI)AfAm   


 


Est GFR (CKD-EPI)NonAf   


 


Random Glucose   


 


Calcium   


 


Total Bilirubin   


 


AST   


 


ALT   


 


Alkaline Phosphatase   


 


Total Protein   


 


Albumin   


 


Syphilis Serology   Reactive A* 


 


RPR Titer   


 


COVID-19 (DINA)  Not detected  














  20





  07:15 07:15


 


WBC  


 


RBC  


 


Hgb  


 


Hct  


 


MCV  


 


MCH  


 


MCHC  


 


RDW  


 


Plt Count  


 


MPV  


 


Sodium  139 


 


Potassium  4.0 


 


Chloride  101 


 


Carbon Dioxide  30 


 


Anion Gap  8 


 


BUN  12.4 


 


Creatinine  0.9 


 


Est GFR (CKD-EPI)AfAm  107.97 


 


Est GFR (CKD-EPI)NonAf  93.16 


 


Random Glucose  97 


 


Calcium  9.0 


 


Total Bilirubin  0.5 


 


AST  30 


 


ALT  31 


 


Alkaline Phosphatase  121 H 


 


Total Protein  7.8 


 


Albumin  3.5 


 


Syphilis Serology  


 


RPR Titer   Reactive 1:1 H D


 


COVID-19 (DINA)  





mr patiño rather sleep and not to talk about syphilis 





20 15:34





Assessment: 





20 15:35


alcohol withdrawal 


Plan: 





librium regiment
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
## 2020-08-24 RX ADMIN — HYDROXYZINE PAMOATE SCH: 25 CAPSULE ORAL at 18:25

## 2020-08-24 RX ADMIN — Medication SCH: at 22:47

## 2020-08-24 RX ADMIN — HYDROXYZINE PAMOATE SCH MG: 25 CAPSULE ORAL at 09:36

## 2020-08-24 RX ADMIN — HYDROXYZINE PAMOATE SCH: 25 CAPSULE ORAL at 07:07

## 2020-08-24 RX ADMIN — AMLODIPINE BESYLATE SCH MG: 10 TABLET ORAL at 09:36

## 2020-08-24 RX ADMIN — Medication SCH TAB: at 09:36

## 2020-08-24 RX ADMIN — HYDROXYZINE PAMOATE SCH: 25 CAPSULE ORAL at 22:47

## 2020-08-24 RX ADMIN — HYDROXYZINE PAMOATE SCH: 25 CAPSULE ORAL at 13:29

## 2020-08-24 NOTE — PN
S CIWA





- CIWA Score


Nausea/Vomitin-Mild Nausea/No Vomiting


Muscle Tremors: 1-None Visible, but Felt


Anxiety: 1-Mildly Anxious


Agitation: 1-Slight > Activity


Paroxysmal Sweats: No Perspiration


Orientation: 0-Oriented


Tacttile Disturbances: 1-Very Mild Itch/Numbness


Auditory Disturbances: 0-None


Visual Disturbances: 1-Very Mild Sensitivity


Headache: 0-None Present


CIWA-Ar Total Score: 6





BHS Progress Note (SOAP)


Subjective: 





59 years old male was admitted on 20 for alcohol withdrawal sx management 

treating with librium detox regiment 


ambulating with walker  slow steady 


rpr 1:1


mr patiño states that he had 15 years ago and was treated 


refuses to have penicillin booster IM 


Objective: 





20 13:05


                               Vital Signs - 24 hr











  20





  16:30 20:15 05:33


 


Temperature 97.7 F 97.3 F L 97.3 F L


 


Pulse Rate 84 88 78


 


Respiratory 18 16 20





Rate   


 


Blood Pressure 150/82 144/71 112/65


 


O2 Sat by Pulse  98 98





Oximetry (%)   














  20





  08:49


 


Temperature 97.5 F L


 


Pulse Rate 85


 


Respiratory 18





Rate 


 


Blood Pressure 123/71


 


O2 Sat by Pulse 





Oximetry (%) 








                                Laboratory Tests











  20





  11:45 07:15 07:15


 


WBC    4.7


 


RBC    4.87


 


Hgb    15.0


 


Hct    45.3


 


MCV    93.2


 


MCH    30.8


 


MCHC    33.0


 


RDW    15.1


 


Plt Count    209


 


MPV    7.1 L


 


Sodium   


 


Potassium   


 


Chloride   


 


Carbon Dioxide   


 


Anion Gap   


 


BUN   


 


Creatinine   


 


Est GFR (CKD-EPI)AfAm   


 


Est GFR (CKD-EPI)NonAf   


 


Random Glucose   


 


Calcium   


 


Total Bilirubin   


 


AST   


 


ALT   


 


Alkaline Phosphatase   


 


Total Protein   


 


Albumin   


 


Syphilis Serology   Reactive A* 


 


RPR Titer   


 


COVID-19 (DINA)  Not detected  














  20





  07:15 07:15


 


WBC  


 


RBC  


 


Hgb  


 


Hct  


 


MCV  


 


MCH  


 


MCHC  


 


RDW  


 


Plt Count  


 


MPV  


 


Sodium  139 


 


Potassium  4.0 


 


Chloride  101 


 


Carbon Dioxide  30 


 


Anion Gap  8 


 


BUN  12.4 


 


Creatinine  0.9 


 


Est GFR (CKD-EPI)AfAm  107.97 


 


Est GFR (CKD-EPI)NonAf  93.16 


 


Random Glucose  97 


 


Calcium  9.0 


 


Total Bilirubin  0.5 


 


AST  30 


 


ALT  31 


 


Alkaline Phosphatase  121 H 


 


Total Protein  7.8 


 


Albumin  3.5 


 


Syphilis Serology  


 


RPR Titer   Reactive 1:1 H D


 


COVID-19 (DINA)  








syphilis contacted treated 


Assessment: 





20 13:07


alcohol withdrawal 


Plan: 





librium regiment

## 2020-08-25 RX ADMIN — HYDROXYZINE PAMOATE SCH: 25 CAPSULE ORAL at 13:30

## 2020-08-25 RX ADMIN — Medication SCH: at 10:43

## 2020-08-25 RX ADMIN — HYDROXYZINE PAMOATE SCH: 25 CAPSULE ORAL at 10:43

## 2020-08-25 RX ADMIN — AMLODIPINE BESYLATE SCH: 10 TABLET ORAL at 10:42

## 2020-08-25 RX ADMIN — Medication SCH MG: at 22:08

## 2020-08-25 RX ADMIN — HYDROXYZINE PAMOATE SCH MG: 25 CAPSULE ORAL at 07:26

## 2020-08-25 NOTE — PN
S CIWA





- CIWA Score


Nausea/Vomitin-No Nausea/No Vomiting


Muscle Tremors: 1-None Visible, but Felt


Anxiety: 1-Mildly Anxious


Agitation: 0-Normal Activity


Paroxysmal Sweats: No Perspiration


Orientation: 0-Oriented


Tacttile Disturbances: 0-None


Auditory Disturbances: 0-None


Visual Disturbances: 1-Very Mild Sensitivity


Headache: 0-None Present


CIWA-Ar Total Score: 3





BHS Progress Note (SOAP)


Subjective: 





59 years old male was admitted on 20 for alcohol withdrawal sx management


treating with librium detox regiment


mr patiño states that he feels better today and the dosage of librium may over 

the limit of tolerance


adjusting librium dosage and discontinue vistaril 


Objective: 





20 15:31


                               Vital Signs - 24 hr











  20





  16:47 20:54 06:24


 


Temperature 98.6 F 98.2 F 97.7 F


 


Pulse Rate 86 84 65


 


Respiratory 18 18 18





Rate   


 


Blood Pressure 118/73 123/76 121/77


 


O2 Sat by Pulse 95 95 95





Oximetry (%)   














  20





  09:07 12:40


 


Temperature 97.8 F 98.2 F


 


Pulse Rate 63 70


 


Respiratory 18 18





Rate  


 


Blood Pressure 109/63 102/62


 


O2 Sat by Pulse 95 95





Oximetry (%)  








                                Laboratory Tests











  20





  11:45 07:15 07:15


 


WBC    4.7


 


RBC    4.87


 


Hgb    15.0


 


Hct    45.3


 


MCV    93.2


 


MCH    30.8


 


MCHC    33.0


 


RDW    15.1


 


Plt Count    209


 


MPV    7.1 L


 


Sodium   


 


Potassium   


 


Chloride   


 


Carbon Dioxide   


 


Anion Gap   


 


BUN   


 


Creatinine   


 


Est GFR (CKD-EPI)AfAm   


 


Est GFR (CKD-EPI)NonAf   


 


Random Glucose   


 


Calcium   


 


Total Bilirubin   


 


AST   


 


ALT   


 


Alkaline Phosphatase   


 


Total Protein   


 


Albumin   


 


Syphilis Serology   Reactive A* 


 


RPR Titer   


 


COVID-19 (DINA)  Not detected  














  20





  07:15 07:15


 


WBC  


 


RBC  


 


Hgb  


 


Hct  


 


MCV  


 


MCH  


 


MCHC  


 


RDW  


 


Plt Count  


 


MPV  


 


Sodium  139 


 


Potassium  4.0 


 


Chloride  101 


 


Carbon Dioxide  30 


 


Anion Gap  8 


 


BUN  12.4 


 


Creatinine  0.9 


 


Est GFR (CKD-EPI)AfAm  107.97 


 


Est GFR (CKD-EPI)NonAf  93.16 


 


Random Glucose  97 


 


Calcium  9.0 


 


Total Bilirubin  0.5 


 


AST  30 


 


ALT  31 


 


Alkaline Phosphatase  121 H 


 


Total Protein  7.8 


 


Albumin  3.5 


 


Syphilis Serology  


 


RPR Titer   Reactive 1:1 H D


 


COVID-19 (DINA)  











20 15:31


syphilis treated 


Assessment: 





20 15:32


alcohol withdrawal 


Plan: 





librium regiment

## 2020-08-26 ENCOUNTER — HOSPITAL ENCOUNTER (INPATIENT)
Dept: HOSPITAL 74 - YASAS | Age: 60
LOS: 13 days | Discharge: HOME | DRG: 897 | End: 2020-09-08
Attending: ALLERGY & IMMUNOLOGY | Admitting: ALLERGY & IMMUNOLOGY
Payer: COMMERCIAL

## 2020-08-26 VITALS — SYSTOLIC BLOOD PRESSURE: 96 MMHG | HEART RATE: 73 BPM | DIASTOLIC BLOOD PRESSURE: 57 MMHG | TEMPERATURE: 97.8 F

## 2020-08-26 DIAGNOSIS — F32.9: ICD-10-CM

## 2020-08-26 DIAGNOSIS — Z96.643: ICD-10-CM

## 2020-08-26 DIAGNOSIS — Z62.810: ICD-10-CM

## 2020-08-26 DIAGNOSIS — Z99.89: ICD-10-CM

## 2020-08-26 DIAGNOSIS — Z59.0: ICD-10-CM

## 2020-08-26 DIAGNOSIS — I25.10: ICD-10-CM

## 2020-08-26 DIAGNOSIS — R00.1: ICD-10-CM

## 2020-08-26 DIAGNOSIS — F20.9: ICD-10-CM

## 2020-08-26 DIAGNOSIS — F10.282: ICD-10-CM

## 2020-08-26 DIAGNOSIS — F10.20: Primary | ICD-10-CM

## 2020-08-26 DIAGNOSIS — Z95.1: ICD-10-CM

## 2020-08-26 DIAGNOSIS — Z88.6: ICD-10-CM

## 2020-08-26 DIAGNOSIS — I10: ICD-10-CM

## 2020-08-26 DIAGNOSIS — J45.909: ICD-10-CM

## 2020-08-26 DIAGNOSIS — Z56.0: ICD-10-CM

## 2020-08-26 PROCEDURE — HZ2ZZZZ DETOXIFICATION SERVICES FOR SUBSTANCE ABUSE TREATMENT: ICD-10-PCS | Performed by: ALLERGY & IMMUNOLOGY

## 2020-08-26 RX ADMIN — Medication SCH: at 10:06

## 2020-08-26 RX ADMIN — HYDROXYZINE PAMOATE PRN MG: 25 CAPSULE ORAL at 21:36

## 2020-08-26 RX ADMIN — Medication SCH MG: at 21:36

## 2020-08-26 RX ADMIN — Medication SCH MG: at 21:37

## 2020-08-26 RX ADMIN — AMLODIPINE BESYLATE SCH: 10 TABLET ORAL at 10:06

## 2020-08-26 SDOH — ECONOMIC STABILITY - INCOME SECURITY: UNEMPLOYMENT, UNSPECIFIED: Z56.0

## 2020-08-26 SDOH — ECONOMIC STABILITY - HOUSING INSECURITY: HOMELESSNESS: Z59.0

## 2020-08-26 NOTE — DS
[facility]HS Detox Discharge Summary


Admission Date: 


20





Discharge Date: 20





- History


Present History: Alcohol Dependence


Additional Comments: 





59 years old male was admitted on 20 for alcohol withdrawal sx management


treated with librium detox regiment


seen by psychiatrist no medical intervention


mr patiño has completed the librium regiment and is tolerated well





General Appearance: Yes: No Apparent Distress


HEENTM: Yes: Hearing grossly Normal, Normocephalic, Normal Voice


Respiratory: Yes: Chest Non-Tender, Lungs Clear, Normal Breath Sounds, No 

Respiratory Distress, No Accessory Muscle Use


Neck: Yes: No masses,lesions,Nodules, Supple


Breast: Yes: Breast Exam Deferred


Cardiology: Yes: Regular Rhythm, S1, S2


Abdominal: Yes: Normal Bowel Sounds, Soft, Increased Bowel Sounds, Protuberent, 

Distended


Genitourinary: Yes: Within Normal Limits


Back: Yes: Normal Inspection


Musculoskeletal: Yes: Joint Stiffness, Muscle weakness


Extremities: Yes: Pedal Edema, Swelling (to LLE, cool to touch), Erythema (mild 

in the LLE), Other (bilateral hip replacement)


Neurological: Yes: Fully Oriented, Alert, Normal Mood/Affect, Normal Response


Integumentary: Yes: warm and dry, Other (stasis changes to RLE)


Lymphatic: Yes: Within Normal Limits


Pertinent Past History: 





time for discharge 33 minutes





- Physical Exam Results


Vital Signs: 


                                   Vital Signs











Temperature  97.8 F   20 08:56


 


Pulse Rate  73   20 08:56


 


Respiratory Rate  18   20 08:56


 


Blood Pressure  96/57 L  20 08:56


 


O2 Sat by Pulse Oximetry (%)  98   20 07:00











Pertinent Admission Physical Exam Findings: 





alcohol withdrawal 


                                Laboratory Tests











  20





  11:45 07:15 07:15


 


WBC    4.7


 


RBC    4.87


 


Hgb    15.0


 


Hct    45.3


 


MCV    93.2


 


MCH    30.8


 


MCHC    33.0


 


RDW    15.1


 


Plt Count    209


 


MPV    7.1 L


 


Sodium   


 


Potassium   


 


Chloride   


 


Carbon Dioxide   


 


Anion Gap   


 


BUN   


 


Creatinine   


 


Est GFR (CKD-EPI)AfAm   


 


Est GFR (CKD-EPI)NonAf   


 


Random Glucose   


 


Calcium   


 


Total Bilirubin   


 


AST   


 


ALT   


 


Alkaline Phosphatase   


 


Total Protein   


 


Albumin   


 


Syphilis Serology   Reactive A* 


 


RPR Titer   


 


COVID-19 (DINA)  Not detected  














  20





  07:15 07:15


 


WBC  


 


RBC  


 


Hgb  


 


Hct  


 


MCV  


 


MCH  


 


MCHC  


 


RDW  


 


Plt Count  


 


MPV  


 


Sodium  139 


 


Potassium  4.0 


 


Chloride  101 


 


Carbon Dioxide  30 


 


Anion Gap  8 


 


BUN  12.4 


 


Creatinine  0.9 


 


Est GFR (CKD-EPI)AfAm  107.97 


 


Est GFR (CKD-EPI)NonAf  93.16 


 


Random Glucose  97 


 


Calcium  9.0 


 


Total Bilirubin  0.5 


 


AST  30 


 


ALT  31 


 


Alkaline Phosphatase  121 H 


 


Total Protein  7.8 


 


Albumin  3.5 


 


Syphilis Serology  


 


RPR Titer   Reactive 1:1 H D


 


COVID-19 (DINA)  








lab noted








- Treatment


Hospital Course: Detox Protocol Followed, Detoxed Safely, Responded well, 

Discharged Condition Good, Rehab Referral Accepted


Patient has Accepted a [facility] Referral to: [facility]





- Medication


Discharge Medications: 


Ambulatory Orders





Amlodipine Besylate [Norvasc -] 10 mg PO DAILY #30 tablet 20 











- Diagnosis


(1) Alcohol dependence with uncomplicated withdrawal


Status: Acute   





(2) Syphilis contact, treated


Status: Chronic   





(3) Asthma


Status: Chronic   


Qualifiers: 


   Asthma severity: mild   Asthma persistence: intermittent   Asthma 

complication type: with status asthmaticus   Qualified Code(s): J45.22 - Mild 

intermittent asthma with status asthmaticus   





(4) Walker as ambulation aid


Status: Chronic   





(5) Substance induced mood disorder


Status: Suspected   





- AMA


Did Patient Leave Against Medical Advice: No





CIWA Score





- CIWA Score


Nausea/Vomitin-No Nausea/No Vomiting


Muscle Tremors: 1-None Visible, but Felt


Anxiety: 1-Mildly Anxious


Agitation: 0-Normal Activity


Paroxysmal Sweats: No Perspiration


Orientation: 0-Oriented


Tacttile Disturbances: 0-None


Auditory Disturbances: 0-None


Visual Disturbances: 0-None


Headache: 0-None Present


CIWA-Ar Total Score: 2

## 2020-08-26 NOTE — HP
BHS MD Rehab Assess/Revision





- Admission History


Ad[facility] to [facility]hab [facility]





- Vital signs


Vital Signs: 


                                   Vital Signs











 Period  Temp  Pulse  Resp  BP Sys/Cisneros  Pulse Ox


 


 Last 24 Hr  97.5 F  86  18    97














- Findings


Detox History & Physical reviewed: Yes


Concur with findings: Yes


Comments/Additional Findings: Pt is a 58 y/o male admitted to rehab [facility] late this morning.  PMHx:Homero. Hip replacement; Heart Murmur and had sx in 

2003 per H/P; RLE Stasis; LLE Erythema;Recommended for Vascular follow up by 

[facility] before coming to detox at  [address]  Alert o x 3.  nad.  oob 

ambulates with walker.  extremities:slight brown discoloration of right lower 

leg;skin intact





Inpatient Rehab Admission





- Rehab Decision to Admit


Inpatient rehab admission?: Yes





- Initial Determination


Are CD services needed?: Yes


Free of communicable disease: Yes


Not in need of hospitalization: Yes





- Rehab Admission Criteria


Previous failed treatment: Yes


Poor recovery environment: Yes


Comorbidities: Yes


Lacks judgement: Yes


Patient is meeting Inpatient Rehab admission criteria:: Yes

## 2020-08-27 RX ADMIN — Medication SCH MG: at 21:17

## 2020-08-27 RX ADMIN — Medication SCH TAB: at 09:27

## 2020-08-27 RX ADMIN — AMLODIPINE BESYLATE SCH MG: 10 TABLET ORAL at 09:27

## 2020-08-28 RX ADMIN — Medication SCH MG: at 21:47

## 2020-08-28 RX ADMIN — HYDROXYZINE PAMOATE PRN MG: 25 CAPSULE ORAL at 21:47

## 2020-08-28 RX ADMIN — Medication SCH TAB: at 09:18

## 2020-08-28 RX ADMIN — AMLODIPINE BESYLATE SCH MG: 10 TABLET ORAL at 09:18

## 2020-08-29 RX ADMIN — Medication SCH MG: at 21:52

## 2020-08-29 RX ADMIN — Medication SCH TAB: at 10:21

## 2020-08-29 RX ADMIN — AMLODIPINE BESYLATE SCH MG: 10 TABLET ORAL at 10:21

## 2020-08-29 RX ADMIN — Medication SCH MG: at 21:51

## 2020-08-30 RX ADMIN — Medication SCH MG: at 22:00

## 2020-08-30 RX ADMIN — AMLODIPINE BESYLATE SCH MG: 10 TABLET ORAL at 10:18

## 2020-08-30 RX ADMIN — Medication SCH TAB: at 10:18

## 2020-08-31 RX ADMIN — Medication SCH TAB: at 10:45

## 2020-08-31 RX ADMIN — AMLODIPINE BESYLATE SCH MG: 10 TABLET ORAL at 10:45

## 2020-08-31 RX ADMIN — Medication SCH MG: at 21:52

## 2020-08-31 RX ADMIN — HYDROXYZINE PAMOATE PRN MG: 25 CAPSULE ORAL at 21:52

## 2020-09-01 RX ADMIN — Medication SCH TAB: at 11:06

## 2020-09-01 RX ADMIN — HYDROXYZINE PAMOATE PRN MG: 25 CAPSULE ORAL at 21:39

## 2020-09-01 RX ADMIN — Medication SCH MG: at 21:39

## 2020-09-01 RX ADMIN — AMLODIPINE BESYLATE SCH MG: 10 TABLET ORAL at 11:06

## 2020-09-02 RX ADMIN — Medication SCH TAB: at 10:20

## 2020-09-02 RX ADMIN — HYDROXYZINE PAMOATE PRN MG: 25 CAPSULE ORAL at 21:28

## 2020-09-02 RX ADMIN — Medication SCH MG: at 21:27

## 2020-09-02 RX ADMIN — Medication SCH MG: at 21:28

## 2020-09-02 RX ADMIN — AMLODIPINE BESYLATE SCH MG: 10 TABLET ORAL at 10:21
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
## 2020-09-03 RX ADMIN — AMLODIPINE BESYLATE SCH MG: 10 TABLET ORAL at 09:04

## 2020-09-03 RX ADMIN — Medication SCH: at 21:23

## 2020-09-03 RX ADMIN — Medication SCH TAB: at 09:04

## 2020-09-03 RX ADMIN — Medication SCH MG: at 21:23

## 2020-09-03 NOTE — PN
BHS Progress Note


Note: 





Psychiatric nurse practitioner note:


Patient requesting a lower of dose of seroquel due to feeling oversedated this 

morning. 1) Will d/c seroquel 100mg. 2) Will order Seroquel 50mg HS. Verbal 

consent given.

## 2020-09-04 RX ADMIN — AMLODIPINE BESYLATE SCH MG: 10 TABLET ORAL at 09:19

## 2020-09-04 RX ADMIN — Medication SCH MG: at 21:44

## 2020-09-04 RX ADMIN — Medication SCH TAB: at 09:19

## 2020-09-04 RX ADMIN — Medication SCH MG: at 21:43

## 2020-09-05 RX ADMIN — Medication SCH MG: at 22:01

## 2020-09-05 RX ADMIN — Medication SCH MG: at 22:00

## 2020-09-05 RX ADMIN — AMLODIPINE BESYLATE SCH MG: 10 TABLET ORAL at 10:15

## 2020-09-05 RX ADMIN — Medication SCH TAB: at 10:15

## 2020-09-06 RX ADMIN — Medication SCH MG: at 21:49

## 2020-09-06 RX ADMIN — Medication SCH TAB: at 09:48

## 2020-09-06 RX ADMIN — Medication SCH MG: at 21:58

## 2020-09-06 RX ADMIN — AMLODIPINE BESYLATE SCH MG: 10 TABLET ORAL at 09:48

## 2020-09-06 RX ADMIN — HYDROXYZINE PAMOATE PRN MG: 25 CAPSULE ORAL at 21:49

## 2020-09-07 RX ADMIN — Medication SCH TAB: at 10:10

## 2020-09-07 RX ADMIN — Medication SCH MG: at 21:22

## 2020-09-07 RX ADMIN — AMLODIPINE BESYLATE SCH MG: 10 TABLET ORAL at 10:10

## 2020-09-07 RX ADMIN — HYDROXYZINE PAMOATE PRN MG: 25 CAPSULE ORAL at 21:23

## 2020-09-07 NOTE — PN
BHS Progress Note


Note: 





Patient is scheduled for discharge tomorrow. Script for 30 days supply of 

Seroquel 50 mg/hs will be electronically transmitted to [facility], [address] [place] [id number]

## 2020-09-08 VITALS — SYSTOLIC BLOOD PRESSURE: 118 MMHG | TEMPERATURE: 97.3 F | DIASTOLIC BLOOD PRESSURE: 68 MMHG | HEART RATE: 91 BPM

## 2020-09-08 RX ADMIN — AMLODIPINE BESYLATE SCH MG: 10 TABLET ORAL at 09:05

## 2020-09-08 RX ADMIN — Medication SCH TAB: at 09:05

## 2020-09-08 NOTE — DS
[facility] Rehab Discharge Summary





- [facility] Rehab Discharge Summary


Admission Date: 08/26/20


Discharge Date: 09/08/20





- History


Present History: Alcohol dependence


Pertinent Past History: 





Asthma


HTN


Hx Cardiac Murmur


Hx CABG


Walker as Ambulatory Aid


Hcx Homero Hip Replacement


Schizophrenia


Depression








- Discharge Physical Exam


Vital Signs: 


                                   Vital Signs











Temperature  97.3 F L  09/08/20 08:46


 


Pulse Rate  91 H  09/08/20 08:46


 


Respiratory Rate  18   09/08/20 08:46


 


Blood Pressure  118/68   09/08/20 08:46


 


O2 Sat by Pulse Oximetry (%)  96   09/08/20 08:46








General:Alert o x 3,


Cardiac:s1 s2,rrr


Lungs:ctab, Puls Ox 96%


Abdomen:Protruded, +bs,nt


MSK:Active FROM, all limbs; LLE slight erythema-chronic; RLE stasis.


Skin:no skin break-intact.


Pertinent Admission Physical Exam Findings: 





s/p detox





- Treatment


Discharge Condition: Discharge condition good, Rehabilitated safely, Responded 

well, Outpatient referral accepted


Hospital Course: 





Pt is a 58 y/o male with admitted to rehab after detox treatment [facility] [address]. Pt 

completed  rehab and discharged today.


Pt accepted CD aftercare to [facility] for aftercare treatment.





- Medication


Discharge Medications: 


Ambulatory Orders





Amlodipine Besylate [Norvasc -] 10 mg PO DAILY #30 tablet 09/07/20 


Quetiapine Fumarate [Seroquel -] 50 mg PO HS #30 tablet 09/07/20 











- Medication-Assisted Treatment (MAT)


Medication-Assisted Treatment (MAT): No





- Discharge Instructions


Diet, activity, other medical instructions: 





Diet:DONALD





Activity: oob ad bethel





Other medical instructions:Follow up wit CD aftercare as scheduled. follow up 

with PCP Dr. Gemma Hughes  at [facility] on [address], in [place], [place] for medical 

management.(pt's verbal report of PCP location.  








- Diagnosis


(1) Alcohol dependence


Status: Chronic   


Qualifiers: 


   Substance use status: uncomplicated   Qualified Code(s): F10.20 - Alcohol 

dependence, uncomplicated   





(2) Asthma


Status: Chronic   


Qualifiers: 


   Asthma severity: unspecified severity   Asthma persistence: unspecified   

Asthma complication type: unspecified   Qualified Code(s): J45.909 - Unspecified

asthma, uncomplicated   





(3) Walker as ambulation aid


Status: Chronic   





(4) H/O bilateral hip replacements


Status: Resolved   





(5) Hypertension


Status: Chronic   


Qualifiers: 


   Hypertension type: essential hypertension   Qualified Code(s): I10 - [facility]

l (primary) hypertension   





- Follow-up Referral


Minutes to complete discharge: 30





- AMA


Did Patient Leave Against Medical Advice: No

## 2020-11-13 ENCOUNTER — HOSPITAL ENCOUNTER (INPATIENT)
Dept: HOSPITAL 74 - YASAS | Age: 60
LOS: 5 days | Discharge: OTHER INSTITUTION | DRG: 897 | End: 2020-11-18
Attending: ALLERGY & IMMUNOLOGY | Admitting: ALLERGY & IMMUNOLOGY
Payer: COMMERCIAL

## 2020-11-13 VITALS — BODY MASS INDEX: 27.4 KG/M2

## 2020-11-13 DIAGNOSIS — I10: ICD-10-CM

## 2020-11-13 DIAGNOSIS — F10.282: ICD-10-CM

## 2020-11-13 DIAGNOSIS — R00.2: ICD-10-CM

## 2020-11-13 DIAGNOSIS — F19.24: ICD-10-CM

## 2020-11-13 DIAGNOSIS — F10.24: ICD-10-CM

## 2020-11-13 DIAGNOSIS — Z62.810: ICD-10-CM

## 2020-11-13 DIAGNOSIS — Z99.89: ICD-10-CM

## 2020-11-13 DIAGNOSIS — I25.10: ICD-10-CM

## 2020-11-13 DIAGNOSIS — Z88.6: ICD-10-CM

## 2020-11-13 DIAGNOSIS — Z87.891: ICD-10-CM

## 2020-11-13 DIAGNOSIS — R26.89: ICD-10-CM

## 2020-11-13 DIAGNOSIS — F10.230: Primary | ICD-10-CM

## 2020-11-13 DIAGNOSIS — F32.9: ICD-10-CM

## 2020-11-13 DIAGNOSIS — Z91.5: ICD-10-CM

## 2020-11-13 DIAGNOSIS — Z95.1: ICD-10-CM

## 2020-11-13 DIAGNOSIS — Z96.643: ICD-10-CM

## 2020-11-13 PROCEDURE — U0003 INFECTIOUS AGENT DETECTION BY NUCLEIC ACID (DNA OR RNA); SEVERE ACUTE RESPIRATORY SYNDROME CORONAVIRUS 2 (SARS-COV-2) (CORONAVIRUS DISEASE [COVID-19]), AMPLIFIED PROBE TECHNIQUE, MAKING USE OF HIGH THROUGHPUT TECHNOLOGIES AS DESCRIBED BY CMS-2020-01-R: HCPCS

## 2020-11-13 PROCEDURE — HZ2ZZZZ DETOXIFICATION SERVICES FOR SUBSTANCE ABUSE TREATMENT: ICD-10-PCS | Performed by: ALLERGY & IMMUNOLOGY

## 2020-11-13 PROCEDURE — C9803 HOPD COVID-19 SPEC COLLECT: HCPCS

## 2020-11-13 RX ADMIN — HYDROXYZINE PAMOATE SCH MG: 25 CAPSULE ORAL at 17:53

## 2020-11-13 RX ADMIN — Medication SCH MG: at 22:13

## 2020-11-13 RX ADMIN — QUETIAPINE FUMARATE SCH MG: 100 TABLET ORAL at 22:13

## 2020-11-13 RX ADMIN — HYDROXYZINE PAMOATE SCH MG: 25 CAPSULE ORAL at 22:13

## 2020-11-13 RX ADMIN — HYDROXYZINE PAMOATE SCH MG: 25 CAPSULE ORAL at 18:02

## 2020-11-14 LAB
ALBUMIN SERPL-MCNC: 3.6 G/DL (ref 3.4–5)
ALP SERPL-CCNC: 115 U/L (ref 45–117)
ALT SERPL-CCNC: 26 U/L (ref 13–61)
ANION GAP SERPL CALC-SCNC: 4 MMOL/L (ref 8–16)
AST SERPL-CCNC: 25 U/L (ref 15–37)
BILIRUB SERPL-MCNC: 0.8 MG/DL (ref 0.2–1)
BUN SERPL-MCNC: 9 MG/DL (ref 7–18)
CALCIUM SERPL-MCNC: 9.2 MG/DL (ref 8.5–10.1)
CHLORIDE SERPL-SCNC: 100 MMOL/L (ref 98–107)
CO2 SERPL-SCNC: 32 MMOL/L (ref 21–32)
CREAT SERPL-MCNC: 0.8 MG/DL (ref 0.55–1.3)
DEPRECATED RDW RBC AUTO: 15 % (ref 11.9–15.9)
GLUCOSE SERPL-MCNC: 136 MG/DL (ref 74–106)
HCT VFR BLD CALC: 46.2 % (ref 35.4–49)
HGB BLD-MCNC: 15.1 GM/DL (ref 11.7–16.9)
MCH RBC QN AUTO: 30.2 PG (ref 25.7–33.7)
MCHC RBC AUTO-ENTMCNC: 32.7 G/DL (ref 32–35.9)
MCV RBC: 92.3 FL (ref 80–96)
POTASSIUM SERPLBLD-SCNC: 4.2 MMOL/L (ref 3.5–5.1)
PROT SERPL-MCNC: 7.9 G/DL (ref 6.4–8.2)
RBC # BLD AUTO: 5 M/MM3 (ref 4–5.6)
SODIUM SERPL-SCNC: 136 MMOL/L (ref 136–145)
WBC # BLD AUTO: 8.2 K/MM3 (ref 4–10)

## 2020-11-14 RX ADMIN — AMLODIPINE BESYLATE SCH MG: 10 TABLET ORAL at 10:24

## 2020-11-14 RX ADMIN — HYDROXYZINE PAMOATE SCH MG: 25 CAPSULE ORAL at 22:05

## 2020-11-14 RX ADMIN — QUETIAPINE FUMARATE SCH MG: 100 TABLET ORAL at 22:04

## 2020-11-14 RX ADMIN — HYDROXYZINE PAMOATE SCH MG: 25 CAPSULE ORAL at 05:35

## 2020-11-14 RX ADMIN — Medication SCH MG: at 22:05

## 2020-11-14 RX ADMIN — HYDROXYZINE PAMOATE SCH: 25 CAPSULE ORAL at 14:09

## 2020-11-14 RX ADMIN — HYDROXYZINE PAMOATE SCH MG: 25 CAPSULE ORAL at 10:24

## 2020-11-14 RX ADMIN — Medication SCH TAB: at 10:24

## 2020-11-14 RX ADMIN — HYDROXYZINE PAMOATE SCH: 25 CAPSULE ORAL at 17:42

## 2020-11-15 RX ADMIN — HYDROXYZINE PAMOATE SCH MG: 25 CAPSULE ORAL at 22:00

## 2020-11-15 RX ADMIN — HYDROXYZINE PAMOATE SCH: 25 CAPSULE ORAL at 07:19

## 2020-11-15 RX ADMIN — AMLODIPINE BESYLATE SCH: 10 TABLET ORAL at 10:24

## 2020-11-15 RX ADMIN — HYDROXYZINE PAMOATE SCH: 25 CAPSULE ORAL at 14:00

## 2020-11-15 RX ADMIN — Medication SCH MG: at 22:00

## 2020-11-15 RX ADMIN — Medication SCH MG: at 22:01

## 2020-11-15 RX ADMIN — HYDROXYZINE PAMOATE SCH: 25 CAPSULE ORAL at 17:42

## 2020-11-15 RX ADMIN — Medication SCH TAB: at 10:23

## 2020-11-15 RX ADMIN — QUETIAPINE FUMARATE SCH MG: 100 TABLET ORAL at 22:00

## 2020-11-15 RX ADMIN — HYDROXYZINE PAMOATE SCH: 25 CAPSULE ORAL at 10:24

## 2020-11-16 RX ADMIN — Medication SCH MG: at 22:12

## 2020-11-16 RX ADMIN — HYDROXYZINE PAMOATE SCH MG: 25 CAPSULE ORAL at 22:12

## 2020-11-16 RX ADMIN — HYDROXYZINE PAMOATE SCH MG: 25 CAPSULE ORAL at 17:25

## 2020-11-16 RX ADMIN — QUETIAPINE FUMARATE SCH MG: 100 TABLET ORAL at 22:12

## 2020-11-16 RX ADMIN — HYDROXYZINE PAMOATE SCH: 25 CAPSULE ORAL at 13:53

## 2020-11-16 RX ADMIN — AMLODIPINE BESYLATE SCH MG: 10 TABLET ORAL at 10:31

## 2020-11-16 RX ADMIN — HYDROXYZINE PAMOATE SCH MG: 25 CAPSULE ORAL at 05:39

## 2020-11-16 RX ADMIN — HYDROXYZINE PAMOATE SCH MG: 25 CAPSULE ORAL at 10:31

## 2020-11-16 RX ADMIN — Medication SCH TAB: at 10:31

## 2020-11-17 LAB
DEPRECATED RDW RBC AUTO: 14.5 % (ref 11.9–15.9)
HCT VFR BLD CALC: 38.8 % (ref 35.4–49)
HGB BLD-MCNC: 12.8 GM/DL (ref 11.7–16.9)
MCH RBC QN AUTO: 30.1 PG (ref 25.7–33.7)
MCHC RBC AUTO-ENTMCNC: 32.8 G/DL (ref 32–35.9)
MCV RBC: 91.6 FL (ref 80–96)
PLATELET # BLD AUTO: 201 K/MM3 (ref 134–434)
PMV BLD: 7 FL (ref 7.5–11.1)
RBC # BLD AUTO: 4.24 M/MM3 (ref 4–5.6)
WBC # BLD AUTO: 5.4 K/MM3 (ref 4–10)

## 2020-11-17 RX ADMIN — AMLODIPINE BESYLATE SCH: 10 TABLET ORAL at 10:12

## 2020-11-17 RX ADMIN — HYDROXYZINE PAMOATE SCH MG: 25 CAPSULE ORAL at 05:27

## 2020-11-17 RX ADMIN — HYDROXYZINE PAMOATE SCH: 25 CAPSULE ORAL at 10:12

## 2020-11-17 RX ADMIN — Medication SCH MG: at 22:03

## 2020-11-17 RX ADMIN — Medication SCH MG: at 22:04

## 2020-11-17 RX ADMIN — Medication SCH TAB: at 10:12

## 2020-11-18 ENCOUNTER — HOSPITAL ENCOUNTER (INPATIENT)
Dept: HOSPITAL 74 - YASAS | Age: 60
LOS: 28 days | Discharge: HOME | DRG: 895 | End: 2020-12-16
Attending: ALLERGY & IMMUNOLOGY | Admitting: ALLERGY & IMMUNOLOGY
Payer: COMMERCIAL

## 2020-11-18 VITALS — SYSTOLIC BLOOD PRESSURE: 151 MMHG | HEART RATE: 89 BPM | DIASTOLIC BLOOD PRESSURE: 79 MMHG | TEMPERATURE: 97.5 F

## 2020-11-18 DIAGNOSIS — Z88.8: ICD-10-CM

## 2020-11-18 DIAGNOSIS — F10.20: Primary | ICD-10-CM

## 2020-11-18 DIAGNOSIS — M79.604: ICD-10-CM

## 2020-11-18 DIAGNOSIS — Z59.0: ICD-10-CM

## 2020-11-18 DIAGNOSIS — R26.2: ICD-10-CM

## 2020-11-18 DIAGNOSIS — M79.605: ICD-10-CM

## 2020-11-18 DIAGNOSIS — G47.00: ICD-10-CM

## 2020-11-18 DIAGNOSIS — Z96.643: ICD-10-CM

## 2020-11-18 DIAGNOSIS — Z87.438: ICD-10-CM

## 2020-11-18 DIAGNOSIS — J45.909: ICD-10-CM

## 2020-11-18 DIAGNOSIS — R60.0: ICD-10-CM

## 2020-11-18 DIAGNOSIS — Z99.89: ICD-10-CM

## 2020-11-18 DIAGNOSIS — G89.29: ICD-10-CM

## 2020-11-18 DIAGNOSIS — Z87.891: ICD-10-CM

## 2020-11-18 PROCEDURE — U0003 INFECTIOUS AGENT DETECTION BY NUCLEIC ACID (DNA OR RNA); SEVERE ACUTE RESPIRATORY SYNDROME CORONAVIRUS 2 (SARS-COV-2) (CORONAVIRUS DISEASE [COVID-19]), AMPLIFIED PROBE TECHNIQUE, MAKING USE OF HIGH THROUGHPUT TECHNOLOGIES AS DESCRIBED BY CMS-2020-01-R: HCPCS

## 2020-11-18 PROCEDURE — C9803 HOPD COVID-19 SPEC COLLECT: HCPCS

## 2020-11-18 PROCEDURE — HZ42ZZZ GROUP COUNSELING FOR SUBSTANCE ABUSE TREATMENT, COGNITIVE-BEHAVIORAL: ICD-10-PCS | Performed by: ALLERGY & IMMUNOLOGY

## 2020-11-18 RX ADMIN — Medication SCH TAB: at 10:11

## 2020-11-18 RX ADMIN — Medication SCH MG: at 21:09

## 2020-11-18 SDOH — ECONOMIC STABILITY - HOUSING INSECURITY: HOMELESSNESS: Z59.0

## 2020-11-19 RX ADMIN — Medication SCH MG: at 21:51

## 2020-11-19 RX ADMIN — Medication SCH TAB: at 10:02

## 2020-11-19 RX ADMIN — ALUMINUM HYDROXIDE, MAGNESIUM HYDROXIDE, AND SIMETHICONE PRN ML: 200; 200; 20 SUSPENSION ORAL at 10:03

## 2020-11-20 RX ADMIN — Medication SCH TAB: at 10:14

## 2020-11-20 RX ADMIN — Medication SCH MG: at 21:17

## 2020-11-21 RX ADMIN — Medication SCH MG: at 21:51

## 2020-11-21 RX ADMIN — Medication SCH TAB: at 10:09

## 2020-11-22 RX ADMIN — Medication SCH MG: at 21:11

## 2020-11-22 RX ADMIN — Medication SCH TAB: at 10:02

## 2020-11-23 RX ADMIN — Medication SCH TAB: at 10:18

## 2020-11-23 RX ADMIN — Medication SCH MG: at 21:39

## 2020-11-24 RX ADMIN — Medication SCH MG: at 21:14

## 2020-11-24 RX ADMIN — Medication SCH: at 11:00

## 2020-11-25 RX ADMIN — Medication SCH MG: at 21:41

## 2020-11-25 RX ADMIN — ALUMINUM HYDROXIDE, MAGNESIUM HYDROXIDE, AND SIMETHICONE PRN ML: 200; 200; 20 SUSPENSION ORAL at 10:14

## 2020-11-25 RX ADMIN — Medication SCH TAB: at 10:12

## 2020-11-26 RX ADMIN — Medication SCH MG: at 23:23

## 2020-11-26 RX ADMIN — Medication SCH TAB: at 10:05

## 2020-11-27 RX ADMIN — Medication SCH MG: at 21:10

## 2020-11-27 RX ADMIN — Medication SCH TAB: at 10:35

## 2020-11-28 RX ADMIN — Medication SCH MG: at 21:42

## 2020-11-28 RX ADMIN — Medication SCH MG: at 21:41

## 2020-11-28 RX ADMIN — Medication SCH TAB: at 09:56

## 2020-11-29 RX ADMIN — Medication SCH TAB: at 09:59

## 2020-11-29 RX ADMIN — Medication SCH MG: at 21:15

## 2020-11-30 RX ADMIN — Medication SCH TAB: at 09:50

## 2020-11-30 RX ADMIN — Medication SCH MG: at 21:32

## 2020-12-01 RX ADMIN — Medication SCH MG: at 21:17

## 2020-12-01 RX ADMIN — Medication SCH TAB: at 09:55

## 2020-12-02 RX ADMIN — Medication SCH TAB: at 10:01

## 2020-12-02 RX ADMIN — Medication SCH MG: at 21:36

## 2020-12-03 RX ADMIN — Medication SCH MG: at 21:37

## 2020-12-03 RX ADMIN — Medication SCH TAB: at 10:02

## 2020-12-04 RX ADMIN — Medication SCH MG: at 21:36

## 2020-12-04 RX ADMIN — Medication SCH TAB: at 10:24

## 2020-12-05 RX ADMIN — Medication SCH TAB: at 09:51

## 2020-12-05 RX ADMIN — Medication SCH MG: at 21:20

## 2020-12-06 RX ADMIN — Medication SCH MG: at 21:51

## 2020-12-06 RX ADMIN — Medication SCH TAB: at 09:55

## 2020-12-07 RX ADMIN — Medication SCH MG: at 21:26

## 2020-12-07 RX ADMIN — Medication SCH TAB: at 10:14

## 2020-12-08 RX ADMIN — Medication SCH MG: at 21:57

## 2020-12-08 RX ADMIN — Medication SCH TAB: at 10:16

## 2020-12-09 RX ADMIN — Medication SCH MG: at 21:16

## 2020-12-09 RX ADMIN — Medication SCH TAB: at 10:37

## 2020-12-10 RX ADMIN — Medication SCH TAB: at 10:12

## 2020-12-10 RX ADMIN — Medication SCH MG: at 22:07

## 2020-12-11 RX ADMIN — Medication SCH MG: at 21:15

## 2020-12-11 RX ADMIN — Medication SCH TAB: at 10:39

## 2020-12-12 RX ADMIN — Medication SCH MG: at 21:37

## 2020-12-12 RX ADMIN — Medication SCH TAB: at 10:22

## 2020-12-13 RX ADMIN — Medication SCH MG: at 21:20

## 2020-12-13 RX ADMIN — Medication SCH TAB: at 10:47

## 2020-12-14 RX ADMIN — Medication SCH MG: at 21:33

## 2020-12-14 RX ADMIN — Medication SCH TAB: at 10:34

## 2020-12-15 RX ADMIN — Medication SCH MG: at 21:29

## 2020-12-15 RX ADMIN — Medication SCH TAB: at 09:47

## 2020-12-16 VITALS — SYSTOLIC BLOOD PRESSURE: 113 MMHG | HEART RATE: 78 BPM | TEMPERATURE: 98.2 F | DIASTOLIC BLOOD PRESSURE: 63 MMHG

## 2020-12-16 RX ADMIN — Medication SCH TAB: at 09:41

## 2021-06-22 ENCOUNTER — HOSPITAL ENCOUNTER (INPATIENT)
Dept: HOSPITAL 74 - YASAS | Age: 61
LOS: 5 days | Discharge: HOME | DRG: 897 | End: 2021-06-27
Attending: ALLERGY & IMMUNOLOGY | Admitting: ALLERGY & IMMUNOLOGY
Payer: COMMERCIAL

## 2021-06-22 VITALS — BODY MASS INDEX: 25.8 KG/M2

## 2021-06-22 DIAGNOSIS — Z99.89: ICD-10-CM

## 2021-06-22 DIAGNOSIS — Z56.0: ICD-10-CM

## 2021-06-22 DIAGNOSIS — Z96.643: ICD-10-CM

## 2021-06-22 DIAGNOSIS — Z87.891: ICD-10-CM

## 2021-06-22 DIAGNOSIS — Z88.8: ICD-10-CM

## 2021-06-22 DIAGNOSIS — Z59.0: ICD-10-CM

## 2021-06-22 DIAGNOSIS — F32.9: ICD-10-CM

## 2021-06-22 DIAGNOSIS — F19.24: ICD-10-CM

## 2021-06-22 DIAGNOSIS — R60.0: ICD-10-CM

## 2021-06-22 DIAGNOSIS — F10.230: Primary | ICD-10-CM

## 2021-06-22 DIAGNOSIS — Z86.79: ICD-10-CM

## 2021-06-22 LAB
ALBUMIN SERPL-MCNC: 3.4 G/DL (ref 3.4–5)
ALP SERPL-CCNC: 108 U/L (ref 45–117)
ALT SERPL-CCNC: 28 U/L (ref 13–61)
ANION GAP SERPL CALC-SCNC: 6 MMOL/L (ref 8–16)
AST SERPL-CCNC: 28 U/L (ref 15–37)
BILIRUB SERPL-MCNC: 0.6 MG/DL (ref 0.2–1)
BUN SERPL-MCNC: 7.6 MG/DL (ref 7–18)
CALCIUM SERPL-MCNC: 8.7 MG/DL (ref 8.5–10.1)
CHLORIDE SERPL-SCNC: 100 MMOL/L (ref 98–107)
CO2 SERPL-SCNC: 30 MMOL/L (ref 21–32)
CREAT SERPL-MCNC: 0.8 MG/DL (ref 0.55–1.3)
DEPRECATED RDW RBC AUTO: 15.3 % (ref 11.9–15.9)
GLUCOSE SERPL-MCNC: 138 MG/DL (ref 74–106)
HCT VFR BLD CALC: 43.7 % (ref 35.4–49)
HGB BLD-MCNC: 14.3 GM/DL (ref 11.7–16.9)
MCH RBC QN AUTO: 29.8 PG (ref 25.7–33.7)
MCHC RBC AUTO-ENTMCNC: 32.7 G/DL (ref 32–35.9)
MCV RBC: 91.2 FL (ref 80–96)
PLATELET # BLD AUTO: 185 10^3/UL (ref 134–434)
PMV BLD: 6.9 FL (ref 7.5–11.1)
PROT SERPL-MCNC: 7.5 G/DL (ref 6.4–8.2)
RBC # BLD AUTO: 4.79 M/MM3 (ref 4–5.6)
SODIUM SERPL-SCNC: 137 MMOL/L (ref 136–145)
WBC # BLD AUTO: 4.7 K/MM3 (ref 4–10)

## 2021-06-22 PROCEDURE — C9803 HOPD COVID-19 SPEC COLLECT: HCPCS

## 2021-06-22 PROCEDURE — HZ2ZZZZ DETOXIFICATION SERVICES FOR SUBSTANCE ABUSE TREATMENT: ICD-10-PCS | Performed by: ALLERGY & IMMUNOLOGY

## 2021-06-22 PROCEDURE — U0005 INFEC AGEN DETEC AMPLI PROBE: HCPCS

## 2021-06-22 PROCEDURE — U0003 INFECTIOUS AGENT DETECTION BY NUCLEIC ACID (DNA OR RNA); SEVERE ACUTE RESPIRATORY SYNDROME CORONAVIRUS 2 (SARS-COV-2) (CORONAVIRUS DISEASE [COVID-19]), AMPLIFIED PROBE TECHNIQUE, MAKING USE OF HIGH THROUGHPUT TECHNOLOGIES AS DESCRIBED BY CMS-2020-01-R: HCPCS

## 2021-06-22 RX ADMIN — Medication SCH MG: at 22:22

## 2021-06-22 RX ADMIN — HYDROXYZINE PAMOATE SCH MG: 25 CAPSULE ORAL at 22:22

## 2021-06-22 RX ADMIN — BACITRACIN ZINC SCH GM: 500 OINTMENT TOPICAL at 22:24

## 2021-06-22 RX ADMIN — BACITRACIN ZINC SCH GM: 500 OINTMENT TOPICAL at 12:37

## 2021-06-22 RX ADMIN — Medication SCH TAB: at 12:36

## 2021-06-22 RX ADMIN — HYDROXYZINE PAMOATE SCH: 25 CAPSULE ORAL at 13:34

## 2021-06-22 RX ADMIN — HYDROXYZINE PAMOATE SCH: 25 CAPSULE ORAL at 17:55

## 2021-06-22 SDOH — ECONOMIC STABILITY - INCOME SECURITY: UNEMPLOYMENT, UNSPECIFIED: Z56.0

## 2021-06-22 SDOH — ECONOMIC STABILITY - HOUSING INSECURITY: HOMELESSNESS: Z59.0

## 2021-06-23 RX ADMIN — Medication SCH MG: at 22:55

## 2021-06-23 RX ADMIN — HYDROXYZINE PAMOATE SCH: 25 CAPSULE ORAL at 06:07

## 2021-06-23 RX ADMIN — HYDROXYZINE PAMOATE SCH: 25 CAPSULE ORAL at 17:50

## 2021-06-23 RX ADMIN — HYDROXYZINE PAMOATE SCH MG: 25 CAPSULE ORAL at 10:28

## 2021-06-23 RX ADMIN — BACITRACIN ZINC SCH GM: 500 OINTMENT TOPICAL at 10:28

## 2021-06-23 RX ADMIN — BACITRACIN ZINC SCH GM: 500 OINTMENT TOPICAL at 22:56

## 2021-06-23 RX ADMIN — HYDROXYZINE PAMOATE SCH: 25 CAPSULE ORAL at 22:59

## 2021-06-23 RX ADMIN — Medication SCH TAB: at 10:28

## 2021-06-23 RX ADMIN — HYDROXYZINE PAMOATE SCH: 25 CAPSULE ORAL at 14:24

## 2021-06-24 RX ADMIN — HYDROXYZINE PAMOATE SCH MG: 25 CAPSULE ORAL at 10:31

## 2021-06-24 RX ADMIN — BACITRACIN ZINC SCH: 500 OINTMENT TOPICAL at 22:55

## 2021-06-24 RX ADMIN — Medication SCH: at 22:55

## 2021-06-24 RX ADMIN — HYDROXYZINE PAMOATE SCH MG: 25 CAPSULE ORAL at 06:20

## 2021-06-24 RX ADMIN — Medication SCH TAB: at 10:31

## 2021-06-24 RX ADMIN — HYDROXYZINE PAMOATE SCH: 25 CAPSULE ORAL at 22:55

## 2021-06-24 RX ADMIN — HYDROXYZINE PAMOATE SCH: 25 CAPSULE ORAL at 17:44

## 2021-06-24 RX ADMIN — BACITRACIN ZINC SCH GM: 500 OINTMENT TOPICAL at 10:30

## 2021-06-24 RX ADMIN — HYDROXYZINE PAMOATE SCH: 25 CAPSULE ORAL at 14:06

## 2021-06-25 RX ADMIN — HYDROXYZINE PAMOATE SCH MG: 25 CAPSULE ORAL at 14:00

## 2021-06-25 RX ADMIN — BACITRACIN ZINC SCH: 500 OINTMENT TOPICAL at 10:29

## 2021-06-25 RX ADMIN — Medication SCH: at 23:06

## 2021-06-25 RX ADMIN — HYDROXYZINE PAMOATE SCH MG: 25 CAPSULE ORAL at 10:29

## 2021-06-25 RX ADMIN — Medication SCH: at 23:07

## 2021-06-25 RX ADMIN — HYDROXYZINE PAMOATE SCH: 25 CAPSULE ORAL at 06:56

## 2021-06-25 RX ADMIN — BACITRACIN ZINC SCH: 500 OINTMENT TOPICAL at 23:06

## 2021-06-25 RX ADMIN — HYDROXYZINE PAMOATE SCH: 25 CAPSULE ORAL at 17:56

## 2021-06-25 RX ADMIN — Medication SCH TAB: at 10:29

## 2021-06-25 RX ADMIN — HYDROXYZINE PAMOATE SCH: 25 CAPSULE ORAL at 23:06

## 2021-06-26 RX ADMIN — Medication SCH TAB: at 11:04

## 2021-06-26 RX ADMIN — Medication SCH: at 22:43

## 2021-06-26 RX ADMIN — HYDROXYZINE PAMOATE SCH: 25 CAPSULE ORAL at 07:12

## 2021-06-26 RX ADMIN — Medication SCH: at 22:44

## 2021-06-26 RX ADMIN — HYDROXYZINE PAMOATE SCH MG: 25 CAPSULE ORAL at 14:42

## 2021-06-26 RX ADMIN — HYDROXYZINE PAMOATE SCH MG: 25 CAPSULE ORAL at 11:03

## 2021-06-26 RX ADMIN — HYDROXYZINE PAMOATE SCH: 25 CAPSULE ORAL at 18:24

## 2021-06-26 RX ADMIN — BACITRACIN ZINC SCH GM: 500 OINTMENT TOPICAL at 11:04

## 2021-06-26 RX ADMIN — HYDROXYZINE PAMOATE SCH: 25 CAPSULE ORAL at 22:44

## 2021-06-26 RX ADMIN — BACITRACIN ZINC SCH: 500 OINTMENT TOPICAL at 22:43

## 2021-06-26 NOTE — HP
CIWA Score





- CIWA Score


Nausea/Vomitin-No Nausea/No Vomiting


Muscle Tremors: 4-Moderate,w/Arms Extend


Anxiety: 4-Mod. Anxious/Guarded


Agitation: 4-Moderately Restless


Paroxysmal Sweats: 3


Orientation: 0-Oriented


Tacttile Disturbances: 0-None


Auditory Disturbances: 0-None


Visual Disturbances: 0-None


Headache: 0-None Present


CIWA-Ar Total Score: 15





Admission ROS [facility]





- HPI


Chief Complaint: 





i am here for detox.


Allergies/Adverse Reactions: 


 Allergies











Allergy/AdvReac Type Severity Reaction Status Date / Time


 


naproxen [From Naprosyn] Allergy Severe Rash Verified 18 12:35











History of Present Illness: 





pt is a 57yr old male with a history of alcohol dependence seeking detox for 

treatment. 


Exam Limitations: No Limitations





- Ebola screening


Have you traveled outside of the country in the last 21 days: No (N)


Have you had contact with anyone from an Ebola affected area: No


Have you been sick,other than usual withdrawal symptoms: No


Do you have a fever: No





- Review of Systems


Constitutional: No Symptoms Reported


EENT: reports: No Symptoms Reported


Respiratory: reports: No Symptoms reported


Cardiac: reports: Syncope


GI: reports: Poor Appetite, Poor Fluid Intake


: reports: No Symptoms Reported


Musculoskeletal: reports: Joint Pain (B/L hip replacement)


Integumentary: reports: No Symptoms Reported


Neuro: reports: Tingling, Tremors


Endocrine: reports: Excessive Sweating, Flushing, Intolerance to Cold, 

Intolerance to Heat


Hematology: reports: No Symptoms Reported


Psychiatric: reports: Judgement Intact, Mood/Affect Appropiate, Orientated x3, 

Agitated, Anxious


Other Systems: Reviewed and Negative





Patient History





- Patient Medical History


Hx Anemia: No


Hx Asthma: No


Hx Chronic Obstructive Pulmonary Disease (COPD): No


Hx Cancer: No


Hx Cardiac Disorders: Yes (murmur)


Hx Congestive Heart Failure: No


Hx Hypertension: No


Hx Hypercholesterolemia: No


Hx Pacemaker: No


HX Cerebrovascular Accident: No


Hx Seizures: No


Hx Dementia: No


Hx Diabetes: No


Hx Gastrointestinal Disorders: No


Hx Liver Disease: No


Hx Genitourinary Disorders: Yes (Hx of gonerrhea.)


Hx Sexually Transmitted Disorders: Yes (gonorrhea)


Hx Renal Disease (ESRD): No


Hx Thyroid Disease: No


Hx Human Immunodeficiency Virus (HIV): No (negative)


Hx Hepatitis C: No (negative)


Hx Depression: Yes (was on wellbutrin)


Hx Suicide Attempt: No (tried to slice wrist 2yrs ago, denies any S&H ideation)


Hx Bipolar Disorder: No


Hx Schizophrenia: No





- Patient Surgical History


Past Surgical History: Yes


Hx Neurologic Surgery: No


Hx Cataract Extraction: No


Hx Cardiac Surgery: Yes (open heart)


Hx Lung Surgery: No


Hx Breast Surgery: No


Hx Breast Biopsy: No


Hx Abdominal Surgery: Yes (hernia repair)


Hx Appendectomy: No


Hx Cholecystectomy: No


Hx Genitourinary Surgery: No


Hx  Section: No


Hx Orthopedic Surgery: Yes (Lt. hip prosthesis,metal plate rt. thigh)


Other Surgical History: sinus,vocal chord


Anesthesia Reaction: No





- PPD History


Previous Implant?: Yes


Documented Results: Negative w/o proof


Implanted On Prior R Admission?: Yes


PPD to be Administered?: Yes





- Reproductive History


Patient is a Female of Child Bearing Age (11 -55 yrs old): No





- Smoking Cessation


Smoking history: Former smoker


Have you smoked in the past 12 months: No


If you are a former smoker, when did you quit?: 


Hx Chewing Tobacco Use: No


Initiated information on smoking cessation: No





- Substance & Tx. History


Hx Alcohol Use: Yes


Hx Substance Use: No


Substance Use Type: Alcohol


Hx Substance Use Treatment: Yes (last detox  [facility])





- Substances Abused


  ** Alcohol-vodka/beer


Route: Oral


Frequency: Daily


Amount used: 1 pt./6-8 (16 oz.)


Age of first use: 16


Date of Last Use: 18





Family Disease History





- Family Disease History


Family History: Denies





Admission Physical Exam [facility]





- Vital Signs


Vital Signs: 


 Vital Signs - 24 hr











  18





  09:42


 


Temperature 97.9 F


 


Pulse Rate 108 H


 


Respiratory 18





Rate 


 


Blood Pressure 170/97














- Physical


General Appearance: Yes: Appropriately Dressed, Disheveled, Moderate Distress, 

Tremorous, Irritable, Sweating, Anxious


HEENTM: Yes: Normal Voice, Nasal Congestion, Rhinorrhea


Respiratory: Yes: Lungs Clear, Normal Breath Sounds, No Respiratory Distress


Neck: Yes: No masses,lesions,Nodules


Breast: Yes: Within Normal Limits


Cardiology: Yes: Regular Rhythm, Regular Rate, S1, S2


Abdominal: Yes: Normal Bowel Sounds, Non Tender, Soft


Genitourinary: Yes: Within Normal Limits


Back: Yes: Normal Inspection


Musculoskeletal: Yes: full range of Motion, Other (hip discomfort h/o hips 

replacement)


Extremities: Yes: Normal Capillary Refill, Normal Inspection, Non-Tender, 

Tremors


Neurological: Yes: Fully Oriented, Alert, Normal Response


Integumentary: Yes: Normal Color, Diaphoresis


Lymphatic: Yes: Within Normal Limits





- Diagnostic


(1) Alcohol dependence with uncomplicated withdrawal


Current Visit: Yes   Status: Chronic   





(2) H/O cardiac murmur


Current Visit: No   Status: Chronic   





(3) H/O bilateral hip replacements


Current Visit: Yes   Status: Chronic   Comment: pt has a waddle gait d/t B/L 

hip replacement   





Cleared for Admission [facility]





- Detox or Rehab


[facility] Level of Care: Medically Managed


Detox Regimen/Protocol: Librium





[facility] Breath Alcohol Content


Breath Alcohol Content: 0





Urine Drug Screen





- Results


Drug Screen Negative: Yes
karthikeyan all pertinent systems normal

## 2021-06-27 VITALS — HEART RATE: 69 BPM | DIASTOLIC BLOOD PRESSURE: 74 MMHG | SYSTOLIC BLOOD PRESSURE: 118 MMHG | TEMPERATURE: 98.1 F

## 2021-06-27 RX ADMIN — HYDROXYZINE PAMOATE SCH: 25 CAPSULE ORAL at 06:47

## 2021-06-27 RX ADMIN — HYDROXYZINE PAMOATE SCH: 25 CAPSULE ORAL at 10:09

## 2021-06-27 RX ADMIN — BACITRACIN ZINC SCH: 500 OINTMENT TOPICAL at 10:09

## 2021-06-27 RX ADMIN — Medication SCH: at 10:09

## 2021-07-20 ENCOUNTER — HOSPITAL ENCOUNTER (INPATIENT)
Dept: HOSPITAL 74 - YASAS | Age: 61
LOS: 6 days | Discharge: HOME | DRG: 897 | End: 2021-07-26
Attending: ALLERGY & IMMUNOLOGY | Admitting: ALLERGY & IMMUNOLOGY
Payer: COMMERCIAL

## 2021-07-20 ENCOUNTER — HOSPITAL ENCOUNTER (EMERGENCY)
Dept: HOSPITAL 74 - JER | Age: 61
LOS: 1 days | Discharge: ANOTHER HOSPITAL | End: 2021-07-21
Payer: COMMERCIAL

## 2021-07-20 VITALS — HEART RATE: 76 BPM | TEMPERATURE: 98 F | SYSTOLIC BLOOD PRESSURE: 124 MMHG | DIASTOLIC BLOOD PRESSURE: 72 MMHG

## 2021-07-20 VITALS — BODY MASS INDEX: 29.3 KG/M2

## 2021-07-20 VITALS — BODY MASS INDEX: 58.7 KG/M2

## 2021-07-20 DIAGNOSIS — R22.42: Primary | ICD-10-CM

## 2021-07-20 DIAGNOSIS — Z96.643: ICD-10-CM

## 2021-07-20 DIAGNOSIS — F10.230: Primary | ICD-10-CM

## 2021-07-20 DIAGNOSIS — Z99.89: ICD-10-CM

## 2021-07-20 DIAGNOSIS — A53.0: ICD-10-CM

## 2021-07-20 DIAGNOSIS — Z98.890: ICD-10-CM

## 2021-07-20 DIAGNOSIS — Z86.19: ICD-10-CM

## 2021-07-20 DIAGNOSIS — L03.115: ICD-10-CM

## 2021-07-20 DIAGNOSIS — Z88.8: ICD-10-CM

## 2021-07-20 LAB
ALBUMIN SERPL-MCNC: 3.3 G/DL (ref 3.4–5)
ALP SERPL-CCNC: 110 U/L (ref 45–117)
ALT SERPL-CCNC: 30 U/L (ref 13–61)
ANION GAP SERPL CALC-SCNC: 5 MMOL/L (ref 8–16)
AST SERPL-CCNC: 19 U/L (ref 15–37)
BASOPHILS # BLD: 1.2 % (ref 0–2)
BILIRUB SERPL-MCNC: 0.3 MG/DL (ref 0.2–1)
BNP SERPL-MCNC: 229.8 PG/ML (ref 5–125)
BUN SERPL-MCNC: 7.2 MG/DL (ref 7–18)
CALCIUM SERPL-MCNC: 8.4 MG/DL (ref 8.5–10.1)
CHLORIDE SERPL-SCNC: 106 MMOL/L (ref 98–107)
CO2 SERPL-SCNC: 28 MMOL/L (ref 21–32)
CREAT SERPL-MCNC: 0.9 MG/DL (ref 0.55–1.3)
DEPRECATED RDW RBC AUTO: 16.3 % (ref 11.9–15.9)
EOSINOPHIL # BLD: 1.7 % (ref 0–4.5)
GLUCOSE SERPL-MCNC: 110 MG/DL (ref 74–106)
HCT VFR BLD CALC: 41.8 % (ref 35.4–49)
HGB BLD-MCNC: 13.9 GM/DL (ref 11.7–16.9)
LYMPHOCYTES # BLD: 25.9 % (ref 8–40)
MCH RBC QN AUTO: 30.1 PG (ref 25.7–33.7)
MCHC RBC AUTO-ENTMCNC: 33.3 G/DL (ref 32–35.9)
MCV RBC: 90.4 FL (ref 80–96)
MONOCYTES # BLD AUTO: 13.1 % (ref 3.8–10.2)
NEUTROPHILS # BLD: 58.1 % (ref 42.8–82.8)
PLATELET # BLD AUTO: 270 10^3/UL (ref 134–434)
PMV BLD: 6.1 FL (ref 7.5–11.1)
PROT SERPL-MCNC: 7.6 G/DL (ref 6.4–8.2)
RBC # BLD AUTO: 4.62 M/MM3 (ref 4–5.6)
SODIUM SERPL-SCNC: 139 MMOL/L (ref 136–145)
WBC # BLD AUTO: 5.1 K/MM3 (ref 4–10)

## 2021-07-20 PROCEDURE — U0005 INFEC AGEN DETEC AMPLI PROBE: HCPCS

## 2021-07-20 PROCEDURE — C9803 HOPD COVID-19 SPEC COLLECT: HCPCS

## 2021-07-20 PROCEDURE — U0003 INFECTIOUS AGENT DETECTION BY NUCLEIC ACID (DNA OR RNA); SEVERE ACUTE RESPIRATORY SYNDROME CORONAVIRUS 2 (SARS-COV-2) (CORONAVIRUS DISEASE [COVID-19]), AMPLIFIED PROBE TECHNIQUE, MAKING USE OF HIGH THROUGHPUT TECHNOLOGIES AS DESCRIBED BY CMS-2020-01-R: HCPCS

## 2021-07-21 PROCEDURE — HZ2ZZZZ DETOXIFICATION SERVICES FOR SUBSTANCE ABUSE TREATMENT: ICD-10-PCS | Performed by: ALLERGY & IMMUNOLOGY

## 2021-07-21 RX ADMIN — Medication SCH TAB: at 10:36

## 2021-07-21 RX ADMIN — SULFAMETHOXAZOLE AND TRIMETHOPRIM SCH EACH: 800; 160 TABLET ORAL at 10:38

## 2021-07-21 RX ADMIN — Medication SCH TAB: at 11:05

## 2021-07-21 RX ADMIN — METHOCARBAMOL PRN MG: 500 TABLET ORAL at 01:38

## 2021-07-21 RX ADMIN — Medication SCH MG: at 22:11

## 2021-07-21 RX ADMIN — SULFAMETHOXAZOLE AND TRIMETHOPRIM SCH EACH: 800; 160 TABLET ORAL at 22:11

## 2021-07-22 LAB
ALBUMIN SERPL-MCNC: 3 G/DL (ref 3.4–5)
ALP SERPL-CCNC: 89 U/L (ref 45–117)
ALT SERPL-CCNC: 22 U/L (ref 13–61)
ANION GAP SERPL CALC-SCNC: 5 MMOL/L (ref 8–16)
AST SERPL-CCNC: 14 U/L (ref 15–37)
BILIRUB SERPL-MCNC: 0.2 MG/DL (ref 0.2–1)
BUN SERPL-MCNC: 12.1 MG/DL (ref 7–18)
CALCIUM SERPL-MCNC: 8.6 MG/DL (ref 8.5–10.1)
CHLORIDE SERPL-SCNC: 107 MMOL/L (ref 98–107)
CO2 SERPL-SCNC: 28 MMOL/L (ref 21–32)
CREAT SERPL-MCNC: 0.9 MG/DL (ref 0.55–1.3)
DEPRECATED RDW RBC AUTO: 16.2 % (ref 11.9–15.9)
GLUCOSE SERPL-MCNC: 92 MG/DL (ref 74–106)
HCT VFR BLD CALC: 40.8 % (ref 35.4–49)
HGB BLD-MCNC: 13 GM/DL (ref 11.7–16.9)
MCH RBC QN AUTO: 29.6 PG (ref 25.7–33.7)
MCHC RBC AUTO-ENTMCNC: 31.9 G/DL (ref 32–35.9)
MCV RBC: 92.7 FL (ref 80–96)
PLATELET # BLD AUTO: 269 10^3/UL (ref 134–434)
PMV BLD: 6.8 FL (ref 7.5–11.1)
PROT SERPL-MCNC: 6.9 G/DL (ref 6.4–8.2)
RBC # BLD AUTO: 4.4 M/MM3 (ref 4–5.6)
SODIUM SERPL-SCNC: 139 MMOL/L (ref 136–145)
WBC # BLD AUTO: 5.5 K/MM3 (ref 4–10)

## 2021-07-22 RX ADMIN — Medication SCH TAB: at 13:05

## 2021-07-22 RX ADMIN — SULFAMETHOXAZOLE AND TRIMETHOPRIM SCH EACH: 800; 160 TABLET ORAL at 22:01

## 2021-07-22 RX ADMIN — Medication SCH MG: at 22:01

## 2021-07-22 RX ADMIN — SULFAMETHOXAZOLE AND TRIMETHOPRIM SCH EACH: 800; 160 TABLET ORAL at 10:05

## 2021-07-22 RX ADMIN — Medication SCH TAB: at 10:05

## 2021-07-23 RX ADMIN — Medication SCH TAB: at 11:28

## 2021-07-23 RX ADMIN — Medication SCH TAB: at 10:32

## 2021-07-23 RX ADMIN — Medication SCH MG: at 23:14

## 2021-07-23 RX ADMIN — Medication SCH MG: at 22:19

## 2021-07-23 RX ADMIN — SULFAMETHOXAZOLE AND TRIMETHOPRIM SCH EACH: 800; 160 TABLET ORAL at 10:32

## 2021-07-23 RX ADMIN — SULFAMETHOXAZOLE AND TRIMETHOPRIM SCH EACH: 800; 160 TABLET ORAL at 22:19

## 2021-07-24 RX ADMIN — SULFAMETHOXAZOLE AND TRIMETHOPRIM SCH EACH: 800; 160 TABLET ORAL at 22:10

## 2021-07-24 RX ADMIN — Medication SCH MG: at 22:11

## 2021-07-24 RX ADMIN — Medication SCH MG: at 22:10

## 2021-07-24 RX ADMIN — METHOCARBAMOL PRN MG: 500 TABLET ORAL at 05:40

## 2021-07-24 RX ADMIN — Medication SCH TAB: at 10:23

## 2021-07-24 RX ADMIN — Medication SCH TAB: at 11:43

## 2021-07-24 RX ADMIN — SULFAMETHOXAZOLE AND TRIMETHOPRIM SCH EACH: 800; 160 TABLET ORAL at 10:23

## 2021-07-25 RX ADMIN — Medication SCH TAB: at 10:57

## 2021-07-25 RX ADMIN — Medication SCH MG: at 22:12

## 2021-07-25 RX ADMIN — Medication SCH TAB: at 10:59

## 2021-07-25 RX ADMIN — SULFAMETHOXAZOLE AND TRIMETHOPRIM SCH EACH: 800; 160 TABLET ORAL at 22:12

## 2021-07-25 RX ADMIN — SULFAMETHOXAZOLE AND TRIMETHOPRIM SCH EACH: 800; 160 TABLET ORAL at 10:57

## 2021-07-26 VITALS — SYSTOLIC BLOOD PRESSURE: 103 MMHG | TEMPERATURE: 98 F | DIASTOLIC BLOOD PRESSURE: 61 MMHG

## 2021-07-26 VITALS — HEART RATE: 87 BPM

## 2021-07-26 RX ADMIN — Medication SCH TAB: at 12:27

## 2021-07-26 RX ADMIN — Medication SCH TAB: at 10:24

## 2021-07-26 RX ADMIN — SULFAMETHOXAZOLE AND TRIMETHOPRIM SCH EACH: 800; 160 TABLET ORAL at 10:24

## 2022-07-15 ENCOUNTER — HOSPITAL ENCOUNTER (INPATIENT)
Dept: HOSPITAL 74 - YASAS | Age: 62
LOS: 5 days | Discharge: OTHER INSTITUTION | DRG: 897 | End: 2022-07-20
Attending: SURGERY | Admitting: ALLERGY & IMMUNOLOGY
Payer: COMMERCIAL

## 2022-07-15 VITALS — BODY MASS INDEX: 28 KG/M2

## 2022-07-15 DIAGNOSIS — Z59.01: ICD-10-CM

## 2022-07-15 DIAGNOSIS — Z88.8: ICD-10-CM

## 2022-07-15 DIAGNOSIS — Z99.89: ICD-10-CM

## 2022-07-15 DIAGNOSIS — Z86.19: ICD-10-CM

## 2022-07-15 DIAGNOSIS — F10.230: Primary | ICD-10-CM

## 2022-07-15 DIAGNOSIS — F10.24: ICD-10-CM

## 2022-07-15 DIAGNOSIS — Z96.643: ICD-10-CM

## 2022-07-15 DIAGNOSIS — R76.8: ICD-10-CM

## 2022-07-15 DIAGNOSIS — F32.9: ICD-10-CM

## 2022-07-15 DIAGNOSIS — Z56.0: ICD-10-CM

## 2022-07-15 PROCEDURE — HZ2ZZZZ DETOXIFICATION SERVICES FOR SUBSTANCE ABUSE TREATMENT: ICD-10-PCS | Performed by: SURGERY

## 2022-07-15 PROCEDURE — U0003 INFECTIOUS AGENT DETECTION BY NUCLEIC ACID (DNA OR RNA); SEVERE ACUTE RESPIRATORY SYNDROME CORONAVIRUS 2 (SARS-COV-2) (CORONAVIRUS DISEASE [COVID-19]), AMPLIFIED PROBE TECHNIQUE, MAKING USE OF HIGH THROUGHPUT TECHNOLOGIES AS DESCRIBED BY CMS-2020-01-R: HCPCS

## 2022-07-15 PROCEDURE — U0005 INFEC AGEN DETEC AMPLI PROBE: HCPCS

## 2022-07-15 SDOH — ECONOMIC STABILITY - HOUSING INSECURITY: SHELTERED HOMELESSNESS: Z59.01

## 2022-07-15 SDOH — ECONOMIC STABILITY - INCOME SECURITY: UNEMPLOYMENT, UNSPECIFIED: Z56.0

## 2022-07-16 LAB
ALBUMIN SERPL-MCNC: 3.5 G/DL (ref 3.4–5)
ALP SERPL-CCNC: 99 U/L (ref 45–117)
ALT SERPL-CCNC: 20 U/L (ref 13–61)
ANION GAP SERPL CALC-SCNC: 5 MMOL/L (ref 8–16)
AST SERPL-CCNC: 17 U/L (ref 15–37)
BILIRUB SERPL-MCNC: 0.7 MG/DL (ref 0.2–1)
BUN SERPL-MCNC: 10.9 MG/DL (ref 7–18)
CALCIUM SERPL-MCNC: 8.8 MG/DL (ref 8.5–10.1)
CHLORIDE SERPL-SCNC: 107 MMOL/L (ref 98–107)
CO2 SERPL-SCNC: 30 MMOL/L (ref 21–32)
CREAT SERPL-MCNC: 0.8 MG/DL (ref 0.55–1.3)
DEPRECATED RDW RBC AUTO: 14.4 % (ref 11.9–15.9)
GLUCOSE SERPL-MCNC: 108 MG/DL (ref 74–106)
HCT VFR BLD CALC: 44 % (ref 35.4–49)
HGB BLD-MCNC: 14.6 GM/DL (ref 11.7–16.9)
MCH RBC QN AUTO: 30.2 PG (ref 25.7–33.7)
MCHC RBC AUTO-ENTMCNC: 33.2 G/DL (ref 32–35.9)
MCV RBC: 90.9 FL (ref 80–96)
PLATELET # BLD AUTO: 213 10^3/UL (ref 134–434)
PMV BLD: 6.8 FL (ref 7.5–11.1)
PROT SERPL-MCNC: 7.3 G/DL (ref 6.4–8.2)
RBC # BLD AUTO: 4.84 M/MM3 (ref 4–5.6)
SODIUM SERPL-SCNC: 142 MMOL/L (ref 136–145)
WBC # BLD AUTO: 4.7 K/MM3 (ref 4–10)

## 2022-07-16 RX ADMIN — Medication SCH MG: at 00:03

## 2022-07-16 RX ADMIN — Medication SCH TAB: at 11:06

## 2022-07-16 RX ADMIN — METHOCARBAMOL PRN MG: 500 TABLET ORAL at 11:06

## 2022-07-16 RX ADMIN — Medication SCH MG: at 23:40

## 2022-07-17 RX ADMIN — Medication SCH MG: at 23:04

## 2022-07-17 RX ADMIN — Medication SCH TAB: at 11:02

## 2022-07-17 RX ADMIN — METHOCARBAMOL PRN MG: 500 TABLET ORAL at 11:02

## 2022-07-18 RX ADMIN — Medication SCH: at 23:17

## 2022-07-18 RX ADMIN — Medication SCH TAB: at 10:32

## 2022-07-19 RX ADMIN — Medication SCH: at 23:06

## 2022-07-19 RX ADMIN — METHOCARBAMOL PRN MG: 500 TABLET ORAL at 06:22

## 2022-07-19 RX ADMIN — Medication SCH TAB: at 10:34

## 2022-07-20 ENCOUNTER — HOSPITAL ENCOUNTER (INPATIENT)
Dept: HOSPITAL 74 - YASAS | Age: 62
LOS: 13 days | Discharge: HOME | DRG: 895 | End: 2022-08-02
Attending: PSYCHIATRY & NEUROLOGY | Admitting: ALLERGY & IMMUNOLOGY
Payer: COMMERCIAL

## 2022-07-20 VITALS — TEMPERATURE: 97.3 F | HEART RATE: 79 BPM | SYSTOLIC BLOOD PRESSURE: 123 MMHG | DIASTOLIC BLOOD PRESSURE: 67 MMHG
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
## 2022-07-20 DIAGNOSIS — F10.20: Primary | ICD-10-CM

## 2022-07-20 DIAGNOSIS — Z56.0: ICD-10-CM

## 2022-07-20 DIAGNOSIS — Z86.19: ICD-10-CM

## 2022-07-20 DIAGNOSIS — Z87.891: ICD-10-CM

## 2022-07-20 DIAGNOSIS — Z87.438: ICD-10-CM
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
## 2022-07-20 DIAGNOSIS — Z99.89: ICD-10-CM

## 2022-07-20 DIAGNOSIS — Z59.01: ICD-10-CM

## 2022-07-20 DIAGNOSIS — Z88.8: ICD-10-CM

## 2022-07-20 DIAGNOSIS — Z96.643: ICD-10-CM
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
## 2022-07-20 DIAGNOSIS — R26.2: ICD-10-CM

## 2022-07-20 LAB
APPEARANCE UR: CLEAR
BILIRUB UR STRIP.AUTO-MCNC: NEGATIVE MG/DL
COLOR UR: YELLOW
KETONES UR QL STRIP: NEGATIVE
LEUKOCYTE ESTERASE UR QL STRIP.AUTO: NEGATIVE
NITRITE UR QL STRIP: NEGATIVE
PH UR: 7 [PH] (ref 5–8)
PROT UR QL STRIP: NEGATIVE
PROT UR QL STRIP: NEGATIVE
SP GR UR: 1.01 (ref 1.01–1.03)
UROBILINOGEN UR STRIP-MCNC: 0.2 MG/DL (ref 0.2–1)

## 2022-07-20 PROCEDURE — HZ42ZZZ GROUP COUNSELING FOR SUBSTANCE ABUSE TREATMENT, COGNITIVE-BEHAVIORAL: ICD-10-PCS | Performed by: PSYCHIATRY & NEUROLOGY

## 2022-07-20 RX ADMIN — Medication SCH TAB: at 10:25

## 2022-07-20 SDOH — ECONOMIC STABILITY - HOUSING INSECURITY: SHELTERED HOMELESSNESS: Z59.01

## 2022-07-20 SDOH — ECONOMIC STABILITY - INCOME SECURITY: UNEMPLOYMENT, UNSPECIFIED: Z56.0

## 2022-07-21 RX ADMIN — Medication SCH TAB: at 11:08

## 2022-07-21 RX ADMIN — NICOTINE SCH: 7 PATCH TRANSDERMAL at 11:08

## 2022-07-21 RX ADMIN — Medication SCH MG: at 00:06

## 2022-07-21 RX ADMIN — Medication SCH MG: at 21:52

## 2022-07-21 RX ADMIN — NICOTINE SCH: 7 PATCH TRANSDERMAL at 01:07

## 2022-07-22 LAB
APPEARANCE UR: CLEAR
BILIRUB UR STRIP.AUTO-MCNC: NEGATIVE MG/DL
COLOR UR: YELLOW
KETONES UR QL STRIP: NEGATIVE
LEUKOCYTE ESTERASE UR QL STRIP.AUTO: NEGATIVE
NITRITE UR QL STRIP: NEGATIVE
PH UR: 5.5 [PH] (ref 5–8)
PROT UR QL STRIP: NEGATIVE
PROT UR QL STRIP: NEGATIVE
SP GR UR: 1.01 (ref 1.01–1.03)
UROBILINOGEN UR STRIP-MCNC: 0.2 MG/DL (ref 0.2–1)

## 2022-07-22 RX ADMIN — Medication SCH: at 10:55

## 2022-07-22 RX ADMIN — Medication SCH MG: at 21:04

## 2022-07-22 RX ADMIN — NICOTINE SCH: 7 PATCH TRANSDERMAL at 10:55

## 2022-07-23 RX ADMIN — Medication SCH MG: at 21:04

## 2022-07-23 RX ADMIN — NICOTINE SCH: 7 PATCH TRANSDERMAL at 10:20

## 2022-07-23 RX ADMIN — Medication SCH: at 10:20

## 2022-07-24 RX ADMIN — Medication SCH: at 10:44

## 2022-07-24 RX ADMIN — Medication SCH MG: at 21:14

## 2022-07-24 RX ADMIN — NICOTINE SCH: 7 PATCH TRANSDERMAL at 10:44

## 2022-07-25 RX ADMIN — Medication SCH MG: at 21:33

## 2022-07-25 RX ADMIN — Medication SCH: at 11:18

## 2022-07-25 RX ADMIN — NICOTINE SCH: 7 PATCH TRANSDERMAL at 11:17

## 2022-07-26 RX ADMIN — NICOTINE SCH: 7 PATCH TRANSDERMAL at 10:45

## 2022-07-26 RX ADMIN — Medication SCH: at 10:45

## 2022-07-26 RX ADMIN — Medication SCH MG: at 21:22

## 2022-07-27 RX ADMIN — Medication SCH: at 22:09

## 2022-07-27 RX ADMIN — Medication SCH: at 10:44

## 2022-07-27 RX ADMIN — NICOTINE SCH: 7 PATCH TRANSDERMAL at 10:44

## 2022-07-28 RX ADMIN — Medication SCH: at 21:10

## 2022-07-28 RX ADMIN — Medication SCH: at 11:56

## 2022-07-28 RX ADMIN — NICOTINE SCH: 7 PATCH TRANSDERMAL at 11:56

## 2022-07-29 RX ADMIN — Medication SCH MG: at 22:00

## 2022-07-29 RX ADMIN — Medication SCH: at 10:39

## 2022-07-29 RX ADMIN — NICOTINE SCH: 7 PATCH TRANSDERMAL at 10:39

## 2022-07-30 RX ADMIN — NICOTINE SCH: 7 PATCH TRANSDERMAL at 10:08

## 2022-07-30 RX ADMIN — Medication SCH: at 21:50

## 2022-07-30 RX ADMIN — Medication SCH: at 10:08

## 2022-07-31 RX ADMIN — Medication SCH: at 21:52

## 2022-07-31 RX ADMIN — NICOTINE SCH: 7 PATCH TRANSDERMAL at 10:52

## 2022-07-31 RX ADMIN — Medication SCH: at 10:53

## 2022-08-01 RX ADMIN — Medication SCH: at 21:07

## 2022-08-01 RX ADMIN — NICOTINE SCH: 7 PATCH TRANSDERMAL at 10:30

## 2022-08-01 RX ADMIN — Medication SCH: at 10:30

## 2022-08-02 VITALS — RESPIRATION RATE: 18 BRPM | TEMPERATURE: 96.9 F

## 2022-08-02 VITALS — HEART RATE: 91 BPM | SYSTOLIC BLOOD PRESSURE: 129 MMHG | DIASTOLIC BLOOD PRESSURE: 69 MMHG

## 2022-08-02 RX ADMIN — Medication SCH: at 10:53

## 2022-08-02 RX ADMIN — NICOTINE SCH: 7 PATCH TRANSDERMAL at 10:53
